# Patient Record
Sex: MALE | Race: WHITE | NOT HISPANIC OR LATINO | Employment: FULL TIME | URBAN - METROPOLITAN AREA
[De-identification: names, ages, dates, MRNs, and addresses within clinical notes are randomized per-mention and may not be internally consistent; named-entity substitution may affect disease eponyms.]

---

## 2024-05-23 ENCOUNTER — HOSPITAL ENCOUNTER (EMERGENCY)
Facility: HOSPITAL | Age: 64
Discharge: ANOTHER HOSPITAL | End: 2024-05-24
Attending: GENERAL PRACTICE
Payer: COMMERCIAL

## 2024-05-23 DIAGNOSIS — I63.412 EMBOLIC STROKE INVOLVING LEFT MIDDLE CEREBRAL ARTERY: Primary | ICD-10-CM

## 2024-05-23 DIAGNOSIS — R29.818 ACUTE FOCAL NEUROLOGICAL DEFICIT: ICD-10-CM

## 2024-05-23 LAB
BASOPHILS # BLD AUTO: 0.07 X10(3)/MCL
BASOPHILS NFR BLD AUTO: 0.9 %
EOSINOPHIL # BLD AUTO: 0.14 X10(3)/MCL (ref 0–0.9)
EOSINOPHIL NFR BLD AUTO: 1.7 %
ERYTHROCYTE [DISTWIDTH] IN BLOOD BY AUTOMATED COUNT: 13.9 % (ref 11.5–17)
HCT VFR BLD AUTO: 42.1 % (ref 42–52)
HGB BLD-MCNC: 14.1 G/DL (ref 14–18)
IMM GRANULOCYTES # BLD AUTO: 0.03 X10(3)/MCL (ref 0–0.04)
IMM GRANULOCYTES NFR BLD AUTO: 0.4 %
LYMPHOCYTES # BLD AUTO: 1.61 X10(3)/MCL (ref 0.6–4.6)
LYMPHOCYTES NFR BLD AUTO: 19.7 %
MCH RBC QN AUTO: 30.9 PG (ref 27–31)
MCHC RBC AUTO-ENTMCNC: 33.5 G/DL (ref 33–36)
MCV RBC AUTO: 92.3 FL (ref 80–94)
MONOCYTES # BLD AUTO: 0.59 X10(3)/MCL (ref 0.1–1.3)
MONOCYTES NFR BLD AUTO: 7.2 %
NEUTROPHILS # BLD AUTO: 5.75 X10(3)/MCL (ref 2.1–9.2)
NEUTROPHILS NFR BLD AUTO: 70.1 %
NRBC BLD AUTO-RTO: 0 %
PLATELET # BLD AUTO: 261 X10(3)/MCL (ref 130–400)
PMV BLD AUTO: 9.6 FL (ref 7.4–10.4)
POC CARDIAC TROPONIN I: 0 NG/ML (ref 0–0.08)
RBC # BLD AUTO: 4.56 X10(6)/MCL (ref 4.7–6.1)
SAMPLE: NORMAL
WBC # SPEC AUTO: 8.19 X10(3)/MCL (ref 4.5–11.5)

## 2024-05-23 PROCEDURE — 36600 WITHDRAWAL OF ARTERIAL BLOOD: CPT

## 2024-05-23 PROCEDURE — 82550 ASSAY OF CK (CPK): CPT | Performed by: GENERAL PRACTICE

## 2024-05-23 PROCEDURE — 83880 ASSAY OF NATRIURETIC PEPTIDE: CPT | Performed by: GENERAL PRACTICE

## 2024-05-23 PROCEDURE — 82962 GLUCOSE BLOOD TEST: CPT

## 2024-05-23 PROCEDURE — 93005 ELECTROCARDIOGRAM TRACING: CPT

## 2024-05-23 PROCEDURE — 82803 BLOOD GASES ANY COMBINATION: CPT

## 2024-05-23 PROCEDURE — 80061 LIPID PANEL: CPT | Performed by: GENERAL PRACTICE

## 2024-05-23 PROCEDURE — 99900031 HC PATIENT EDUCATION (STAT)

## 2024-05-23 PROCEDURE — 99291 CRITICAL CARE FIRST HOUR: CPT

## 2024-05-23 PROCEDURE — 85025 COMPLETE CBC W/AUTO DIFF WBC: CPT | Performed by: GENERAL PRACTICE

## 2024-05-23 PROCEDURE — 84443 ASSAY THYROID STIM HORMONE: CPT | Performed by: GENERAL PRACTICE

## 2024-05-23 PROCEDURE — 82553 CREATINE MB FRACTION: CPT | Performed by: GENERAL PRACTICE

## 2024-05-23 PROCEDURE — 80053 COMPREHEN METABOLIC PANEL: CPT | Performed by: GENERAL PRACTICE

## 2024-05-23 RX ADMIN — IOPAMIDOL 50 ML: 755 INJECTION, SOLUTION INTRAVENOUS at 11:05

## 2024-05-24 ENCOUNTER — ANESTHESIA (OUTPATIENT)
Dept: INTERVENTIONAL RADIOLOGY/VASCULAR | Facility: HOSPITAL | Age: 64
DRG: 023 | End: 2024-05-24
Payer: COMMERCIAL

## 2024-05-24 ENCOUNTER — HOSPITAL ENCOUNTER (INPATIENT)
Dept: INTERVENTIONAL RADIOLOGY/VASCULAR | Facility: HOSPITAL | Age: 64
LOS: 5 days | Discharge: HOSPICE | DRG: 023 | End: 2024-05-29
Attending: PSYCHIATRY & NEUROLOGY | Admitting: INTERNAL MEDICINE
Payer: COMMERCIAL

## 2024-05-24 VITALS
WEIGHT: 191 LBS | SYSTOLIC BLOOD PRESSURE: 179 MMHG | DIASTOLIC BLOOD PRESSURE: 86 MMHG | BODY MASS INDEX: 27.35 KG/M2 | OXYGEN SATURATION: 98 % | HEIGHT: 70 IN | TEMPERATURE: 99 F | HEART RATE: 80 BPM | RESPIRATION RATE: 17 BRPM

## 2024-05-24 DIAGNOSIS — I65.22 CAROTID ARTERY STENOSIS, SYMPTOMATIC, LEFT: ICD-10-CM

## 2024-05-24 DIAGNOSIS — I48.91 A-FIB: ICD-10-CM

## 2024-05-24 DIAGNOSIS — I63.512 ACUTE CEREBROVASCULAR ACCIDENT (CVA) DUE TO OCCLUSION OF LEFT MIDDLE CEREBRAL ARTERY: Primary | ICD-10-CM

## 2024-05-24 DIAGNOSIS — I63.9 CEREBROVASCULAR ACCIDENT (CVA), UNSPECIFIED MECHANISM: ICD-10-CM

## 2024-05-24 DIAGNOSIS — I63.9 STROKE: ICD-10-CM

## 2024-05-24 DIAGNOSIS — Z09 FOLLOW-UP EXAM: ICD-10-CM

## 2024-05-24 DIAGNOSIS — R00.0 TACHYCARDIA: ICD-10-CM

## 2024-05-24 DIAGNOSIS — I63.9 ACUTE CVA (CEREBROVASCULAR ACCIDENT): ICD-10-CM

## 2024-05-24 DIAGNOSIS — I49.9 ARRHYTHMIA: ICD-10-CM

## 2024-05-24 LAB
ALBUMIN SERPL-MCNC: 3.5 G/DL (ref 3.4–4.8)
ALBUMIN SERPL-MCNC: 4.1 G/DL (ref 3.4–4.8)
ALBUMIN/GLOB SERPL: 1.3 RATIO (ref 1.1–2)
ALBUMIN/GLOB SERPL: 1.4 RATIO (ref 1.1–2)
ALP SERPL-CCNC: 44 UNIT/L (ref 40–150)
ALP SERPL-CCNC: 48 UNIT/L (ref 40–150)
ALT SERPL-CCNC: 16 UNIT/L (ref 0–55)
ALT SERPL-CCNC: 24 UNIT/L (ref 0–55)
ANION GAP SERPL CALC-SCNC: 11 MEQ/L
ANION GAP SERPL CALC-SCNC: 7 MEQ/L
APTT PPP: 24.3 SECONDS (ref 21.4–28.3)
AST SERPL-CCNC: 15 UNIT/L (ref 5–34)
AST SERPL-CCNC: 19 UNIT/L (ref 5–34)
BASOPHILS # BLD AUTO: 0.04 X10(3)/MCL
BASOPHILS NFR BLD AUTO: 0.4 %
BILIRUB SERPL-MCNC: 0.2 MG/DL
BILIRUB SERPL-MCNC: 0.2 MG/DL
BNP BLD-MCNC: 19.6 PG/ML
BUN SERPL-MCNC: 19.5 MG/DL (ref 8.4–25.7)
BUN SERPL-MCNC: 20 MG/DL (ref 8.4–25.7)
CALCIUM SERPL-MCNC: 8.4 MG/DL (ref 8.8–10)
CALCIUM SERPL-MCNC: 9.8 MG/DL (ref 8.8–10)
CHLORIDE SERPL-SCNC: 108 MMOL/L (ref 98–107)
CHLORIDE SERPL-SCNC: 109 MMOL/L (ref 98–107)
CHOLEST SERPL-MCNC: 270 MG/DL
CHOLEST/HDLC SERPL: 8 {RATIO} (ref 0–5)
CK MB SERPL-MCNC: 1.2 NG/ML
CK SERPL-CCNC: 203 U/L (ref 30–200)
CO2 SERPL-SCNC: 20 MMOL/L (ref 23–31)
CO2 SERPL-SCNC: 21 MMOL/L (ref 23–31)
CREAT SERPL-MCNC: 0.85 MG/DL (ref 0.73–1.18)
CREAT SERPL-MCNC: 1.1 MG/DL (ref 0.73–1.18)
CREAT/UREA NIT SERPL: 18
CREAT/UREA NIT SERPL: 23
EOSINOPHIL # BLD AUTO: 0.05 X10(3)/MCL (ref 0–0.9)
EOSINOPHIL NFR BLD AUTO: 0.5 %
ERYTHROCYTE [DISTWIDTH] IN BLOOD BY AUTOMATED COUNT: 13.9 % (ref 11.5–17)
EST. AVERAGE GLUCOSE BLD GHB EST-MCNC: 114 MG/DL
FIO2: 21
GFR SERPLBLD CREATININE-BSD FMLA CKD-EPI: >60 ML/MIN/1.73/M2
GFR SERPLBLD CREATININE-BSD FMLA CKD-EPI: >60 ML/MIN/1.73/M2
GLOBULIN SER-MCNC: 2.6 GM/DL (ref 2.4–3.5)
GLOBULIN SER-MCNC: 3 GM/DL (ref 2.4–3.5)
GLUCOSE SERPL-MCNC: 103 MG/DL (ref 82–115)
GLUCOSE SERPL-MCNC: 107 MG/DL (ref 82–115)
GLUCOSE SERPL-MCNC: 94 MG/DL (ref 70–110)
HBA1C MFR BLD: 5.6 %
HCO3 UR-SCNC: 22.8 MMOL/L (ref 24–28)
HCT VFR BLD AUTO: 39.2 % (ref 42–52)
HDLC SERPL-MCNC: 35 MG/DL (ref 35–60)
HGB BLD-MCNC: 13.1 G/DL (ref 14–18)
IMM GRANULOCYTES # BLD AUTO: 0.04 X10(3)/MCL (ref 0–0.04)
IMM GRANULOCYTES NFR BLD AUTO: 0.4 %
INR PPP: 0.9
LDLC SERPL CALC-MCNC: 156 MG/DL (ref 50–140)
LYMPHOCYTES # BLD AUTO: 0.89 X10(3)/MCL (ref 0.6–4.6)
LYMPHOCYTES NFR BLD AUTO: 8.5 %
MCH RBC QN AUTO: 31.4 PG (ref 27–31)
MCHC RBC AUTO-ENTMCNC: 33.4 G/DL (ref 33–36)
MCV RBC AUTO: 94 FL (ref 80–94)
MONOCYTES # BLD AUTO: 0.33 X10(3)/MCL (ref 0.1–1.3)
MONOCYTES NFR BLD AUTO: 3.1 %
NEUTROPHILS # BLD AUTO: 9.15 X10(3)/MCL (ref 2.1–9.2)
NEUTROPHILS NFR BLD AUTO: 87.1 %
NRBC BLD AUTO-RTO: 0 %
PCO2 BLDA: 39 MMHG (ref 35–45)
PH SMN: 7.37 [PH] (ref 7.35–7.45)
PLATELET # BLD AUTO: 232 X10(3)/MCL (ref 130–400)
PMV BLD AUTO: 9.7 FL (ref 7.4–10.4)
PO2 BLDA: 77 MMHG (ref 80–100)
POC BE: -2 MMOL/L
POC SATURATED O2: 95 % (ref 95–100)
POC TCO2: 24 MMOL/L (ref 23–27)
POCT GLUCOSE: 103 MG/DL (ref 70–110)
POCT GLUCOSE: 109 MG/DL (ref 70–110)
POCT GLUCOSE: 120 MG/DL (ref 70–110)
POTASSIUM SERPL-SCNC: 4.4 MMOL/L (ref 3.5–5.1)
POTASSIUM SERPL-SCNC: 4.6 MMOL/L (ref 3.5–5.1)
PROT SERPL-MCNC: 6.1 GM/DL (ref 5.8–7.6)
PROT SERPL-MCNC: 7.1 GM/DL (ref 5.8–7.6)
PROTHROMBIN TIME: 9.7 SECONDS (ref 9.1–10.9)
RBC # BLD AUTO: 4.17 X10(6)/MCL (ref 4.7–6.1)
SAMPLE: ABNORMAL
SODIUM SERPL-SCNC: 136 MMOL/L (ref 136–145)
SODIUM SERPL-SCNC: 140 MMOL/L (ref 136–145)
TRIGL SERPL-MCNC: 393 MG/DL (ref 34–140)
TSH SERPL-ACNC: 0.82 UIU/ML (ref 0.35–4.94)
VLDLC SERPL CALC-MCNC: 79 MG/DL
WBC # SPEC AUTO: 10.5 X10(3)/MCL (ref 4.5–11.5)

## 2024-05-24 PROCEDURE — 27100171 HC OXYGEN HIGH FLOW UP TO 24 HOURS

## 2024-05-24 PROCEDURE — 63600175 PHARM REV CODE 636 W HCPCS

## 2024-05-24 PROCEDURE — 80053 COMPREHEN METABOLIC PANEL: CPT

## 2024-05-24 PROCEDURE — 94761 N-INVAS EAR/PLS OXIMETRY MLT: CPT

## 2024-05-24 PROCEDURE — 99900031 HC PATIENT EDUCATION (STAT)

## 2024-05-24 PROCEDURE — 85730 THROMBOPLASTIN TIME PARTIAL: CPT | Performed by: GENERAL PRACTICE

## 2024-05-24 PROCEDURE — 85025 COMPLETE CBC W/AUTO DIFF WBC: CPT

## 2024-05-24 PROCEDURE — 63600175 PHARM REV CODE 636 W HCPCS: Performed by: NURSE ANESTHETIST, CERTIFIED REGISTERED

## 2024-05-24 PROCEDURE — B3141ZZ FLUOROSCOPY OF LEFT COMMON CAROTID ARTERY USING LOW OSMOLAR CONTRAST: ICD-10-PCS | Performed by: PSYCHIATRY & NEUROLOGY

## 2024-05-24 PROCEDURE — 25000003 PHARM REV CODE 250: Performed by: PSYCHIATRY & NEUROLOGY

## 2024-05-24 PROCEDURE — 25500020 PHARM REV CODE 255: Performed by: PSYCHIATRY & NEUROLOGY

## 2024-05-24 PROCEDURE — 94760 N-INVAS EAR/PLS OXIMETRY 1: CPT

## 2024-05-24 PROCEDURE — 85610 PROTHROMBIN TIME: CPT | Performed by: GENERAL PRACTICE

## 2024-05-24 PROCEDURE — 94002 VENT MGMT INPAT INIT DAY: CPT

## 2024-05-24 PROCEDURE — 36415 COLL VENOUS BLD VENIPUNCTURE: CPT

## 2024-05-24 PROCEDURE — 61645 PERQ ART M-THROMBECT &/NFS: CPT

## 2024-05-24 PROCEDURE — 99900035 HC TECH TIME PER 15 MIN (STAT)

## 2024-05-24 PROCEDURE — C9248 INJ, CLEVIDIPINE BUTYRATE: HCPCS | Performed by: INTERNAL MEDICINE

## 2024-05-24 PROCEDURE — 63600175 PHARM REV CODE 636 W HCPCS: Performed by: INTERNAL MEDICINE

## 2024-05-24 PROCEDURE — 5A1945Z RESPIRATORY VENTILATION, 24-96 CONSECUTIVE HOURS: ICD-10-PCS | Performed by: PSYCHIATRY & NEUROLOGY

## 2024-05-24 PROCEDURE — D9220A PRA ANESTHESIA: Mod: ANES,,, | Performed by: ANESTHESIOLOGY

## 2024-05-24 PROCEDURE — 02HV33Z INSERTION OF INFUSION DEVICE INTO SUPERIOR VENA CAVA, PERCUTANEOUS APPROACH: ICD-10-PCS | Performed by: PSYCHIATRY & NEUROLOGY

## 2024-05-24 PROCEDURE — 25000003 PHARM REV CODE 250: Performed by: NURSE PRACTITIONER

## 2024-05-24 PROCEDURE — 25000003 PHARM REV CODE 250: Performed by: NURSE ANESTHETIST, CERTIFIED REGISTERED

## 2024-05-24 PROCEDURE — 93010 ELECTROCARDIOGRAM REPORT: CPT | Mod: ,,, | Performed by: INTERNAL MEDICINE

## 2024-05-24 PROCEDURE — 93005 ELECTROCARDIOGRAM TRACING: CPT

## 2024-05-24 PROCEDURE — B3171ZZ FLUOROSCOPY OF LEFT INTERNAL CAROTID ARTERY USING LOW OSMOLAR CONTRAST: ICD-10-PCS | Performed by: PSYCHIATRY & NEUROLOGY

## 2024-05-24 PROCEDURE — 36569 INSJ PICC 5 YR+ W/O IMAGING: CPT

## 2024-05-24 PROCEDURE — 99223 1ST HOSP IP/OBS HIGH 75: CPT | Mod: 25,,, | Performed by: PSYCHIATRY & NEUROLOGY

## 2024-05-24 PROCEDURE — C1751 CATH, INF, PER/CENT/MIDLINE: HCPCS

## 2024-05-24 PROCEDURE — 03CG3ZZ EXTIRPATION OF MATTER FROM INTRACRANIAL ARTERY, PERCUTANEOUS APPROACH: ICD-10-PCS | Performed by: PSYCHIATRY & NEUROLOGY

## 2024-05-24 PROCEDURE — 61645 PERQ ART M-THROMBECT &/NFS: CPT | Mod: LT,,, | Performed by: PSYCHIATRY & NEUROLOGY

## 2024-05-24 PROCEDURE — 25000003 PHARM REV CODE 250: Performed by: INTERNAL MEDICINE

## 2024-05-24 PROCEDURE — 27000221 HC OXYGEN, UP TO 24 HOURS

## 2024-05-24 PROCEDURE — 20000000 HC ICU ROOM

## 2024-05-24 PROCEDURE — 63600175 PHARM REV CODE 636 W HCPCS: Performed by: PSYCHIATRY & NEUROLOGY

## 2024-05-24 PROCEDURE — 83036 HEMOGLOBIN GLYCOSYLATED A1C: CPT | Performed by: NURSE PRACTITIONER

## 2024-05-24 PROCEDURE — 037L3ZZ DILATION OF LEFT INTERNAL CAROTID ARTERY, PERCUTANEOUS APPROACH: ICD-10-PCS | Performed by: PSYCHIATRY & NEUROLOGY

## 2024-05-24 PROCEDURE — D9220A PRA ANESTHESIA: Mod: CRNA,,, | Performed by: NURSE ANESTHETIST, CERTIFIED REGISTERED

## 2024-05-24 PROCEDURE — 99900026 HC AIRWAY MAINTENANCE (STAT)

## 2024-05-24 PROCEDURE — 27200966 HC CLOSED SUCTION SYSTEM

## 2024-05-24 PROCEDURE — 25500020 PHARM REV CODE 255: Performed by: GENERAL PRACTICE

## 2024-05-24 RX ORDER — FAMOTIDINE 10 MG/ML
20 INJECTION INTRAVENOUS DAILY
Status: DISCONTINUED | OUTPATIENT
Start: 2024-05-24 | End: 2024-05-25

## 2024-05-24 RX ORDER — ATORVASTATIN CALCIUM 40 MG/1
80 TABLET, FILM COATED ORAL DAILY
Status: DISCONTINUED | OUTPATIENT
Start: 2024-05-24 | End: 2024-05-29 | Stop reason: HOSPADM

## 2024-05-24 RX ORDER — ASPIRIN 300 MG/1
300 SUPPOSITORY RECTAL DAILY
Status: DISCONTINUED | OUTPATIENT
Start: 2024-05-24 | End: 2024-05-29 | Stop reason: HOSPADM

## 2024-05-24 RX ORDER — ONDANSETRON HYDROCHLORIDE 2 MG/ML
INJECTION, SOLUTION INTRAVENOUS
Status: COMPLETED
Start: 2024-05-24 | End: 2024-05-24

## 2024-05-24 RX ORDER — ACETAMINOPHEN 325 MG/1
650 TABLET ORAL EVERY 4 HOURS PRN
Status: DISCONTINUED | OUTPATIENT
Start: 2024-05-24 | End: 2024-05-25

## 2024-05-24 RX ORDER — FENTANYL CITRATE 50 UG/ML
INJECTION, SOLUTION INTRAMUSCULAR; INTRAVENOUS
Status: DISCONTINUED | OUTPATIENT
Start: 2024-05-24 | End: 2024-05-24

## 2024-05-24 RX ORDER — PHENYLEPHRINE HYDROCHLORIDE 10 MG/ML
INJECTION INTRAVENOUS
Status: DISCONTINUED | OUTPATIENT
Start: 2024-05-24 | End: 2024-05-24

## 2024-05-24 RX ORDER — SODIUM CHLORIDE 0.9 % (FLUSH) 0.9 %
10 SYRINGE (ML) INJECTION EVERY 6 HOURS
Status: DISCONTINUED | OUTPATIENT
Start: 2024-05-25 | End: 2024-05-29 | Stop reason: HOSPADM

## 2024-05-24 RX ORDER — PROPOFOL 10 MG/ML
0-50 INJECTION, EMULSION INTRAVENOUS CONTINUOUS
Status: DISCONTINUED | OUTPATIENT
Start: 2024-05-24 | End: 2024-05-24

## 2024-05-24 RX ORDER — BISACODYL 10 MG/1
10 SUPPOSITORY RECTAL DAILY PRN
Status: DISCONTINUED | OUTPATIENT
Start: 2024-05-24 | End: 2024-05-29 | Stop reason: HOSPADM

## 2024-05-24 RX ORDER — ONDANSETRON 4 MG/1
8 TABLET, ORALLY DISINTEGRATING ORAL EVERY 8 HOURS PRN
Status: DISCONTINUED | OUTPATIENT
Start: 2024-05-24 | End: 2024-05-29 | Stop reason: HOSPADM

## 2024-05-24 RX ORDER — ROCURONIUM BROMIDE 10 MG/ML
INJECTION, SOLUTION INTRAVENOUS
Status: DISCONTINUED | OUTPATIENT
Start: 2024-05-24 | End: 2024-05-24

## 2024-05-24 RX ORDER — ATROPINE SULFATE 0.1 MG/ML
INJECTION INTRAVENOUS
Status: DISCONTINUED | OUTPATIENT
Start: 2024-05-24 | End: 2024-05-24

## 2024-05-24 RX ORDER — LIDOCAINE HYDROCHLORIDE 20 MG/ML
INJECTION, SOLUTION EPIDURAL; INFILTRATION; INTRACAUDAL; PERINEURAL
Status: DISCONTINUED | OUTPATIENT
Start: 2024-05-24 | End: 2024-05-24

## 2024-05-24 RX ORDER — 3% SODIUM CHLORIDE 3 G/100ML
60 INJECTION, SOLUTION INTRAVENOUS CONTINUOUS
Status: DISCONTINUED | OUTPATIENT
Start: 2024-05-24 | End: 2024-05-27

## 2024-05-24 RX ORDER — SODIUM CHLORIDE 9 MG/ML
INJECTION, SOLUTION INTRAVENOUS CONTINUOUS
Status: DISCONTINUED | OUTPATIENT
Start: 2024-05-24 | End: 2024-05-24

## 2024-05-24 RX ORDER — GLYCOPYRROLATE 0.2 MG/ML
INJECTION INTRAMUSCULAR; INTRAVENOUS
Status: DISCONTINUED | OUTPATIENT
Start: 2024-05-24 | End: 2024-05-24

## 2024-05-24 RX ORDER — HYDRALAZINE HYDROCHLORIDE 20 MG/ML
10 INJECTION INTRAMUSCULAR; INTRAVENOUS EVERY 6 HOURS PRN
Status: DISCONTINUED | OUTPATIENT
Start: 2024-05-24 | End: 2024-05-28

## 2024-05-24 RX ORDER — SODIUM CHLORIDE 0.9 % (FLUSH) 0.9 %
10 SYRINGE (ML) INJECTION
Status: DISCONTINUED | OUTPATIENT
Start: 2024-05-24 | End: 2024-05-29 | Stop reason: HOSPADM

## 2024-05-24 RX ORDER — PROPOFOL 10 MG/ML
0-50 INJECTION, EMULSION INTRAVENOUS CONTINUOUS
Status: DISCONTINUED | OUTPATIENT
Start: 2024-05-24 | End: 2024-05-26

## 2024-05-24 RX ORDER — FAMOTIDINE 10 MG/ML
20 INJECTION INTRAVENOUS DAILY
Status: DISCONTINUED | OUTPATIENT
Start: 2024-05-25 | End: 2024-05-24

## 2024-05-24 RX ORDER — ONDANSETRON HYDROCHLORIDE 2 MG/ML
INJECTION, SOLUTION INTRAVENOUS
Status: DISCONTINUED | OUTPATIENT
Start: 2024-05-24 | End: 2024-05-24

## 2024-05-24 RX ORDER — PROPOFOL 10 MG/ML
VIAL (ML) INTRAVENOUS
Status: DISCONTINUED | OUTPATIENT
Start: 2024-05-24 | End: 2024-05-24

## 2024-05-24 RX ORDER — DEXAMETHASONE SODIUM PHOSPHATE 4 MG/ML
INJECTION, SOLUTION INTRA-ARTICULAR; INTRALESIONAL; INTRAMUSCULAR; INTRAVENOUS; SOFT TISSUE
Status: DISCONTINUED | OUTPATIENT
Start: 2024-05-24 | End: 2024-05-24

## 2024-05-24 RX ORDER — FAMOTIDINE 20 MG/1
20 TABLET, FILM COATED ORAL DAILY
Status: DISCONTINUED | OUTPATIENT
Start: 2024-05-24 | End: 2024-05-24

## 2024-05-24 RX ADMIN — PROPOFOL 200 MG: 10 INJECTION, EMULSION INTRAVENOUS at 02:05

## 2024-05-24 RX ADMIN — SODIUM CHLORIDE 60 ML/HR: 3 INJECTION, SOLUTION INTRAVENOUS at 07:05

## 2024-05-24 RX ADMIN — ATROPINE SULFATE 0.4 MG: 0.1 INJECTION INTRAVENOUS at 02:05

## 2024-05-24 RX ADMIN — GLYCOPYRROLATE 0.2 MG: 0.2 INJECTION INTRAMUSCULAR; INTRAVENOUS at 02:05

## 2024-05-24 RX ADMIN — ROCURONIUM BROMIDE 80 MG: 10 SOLUTION INTRAVENOUS at 02:05

## 2024-05-24 RX ADMIN — FENTANYL CITRATE 50 MCG: 50 INJECTION, SOLUTION INTRAMUSCULAR; INTRAVENOUS at 02:05

## 2024-05-24 RX ADMIN — FENTANYL CITRATE 50 MCG: 50 INJECTION, SOLUTION INTRAMUSCULAR; INTRAVENOUS at 03:05

## 2024-05-24 RX ADMIN — SODIUM CHLORIDE: 9 INJECTION, SOLUTION INTRAVENOUS at 04:05

## 2024-05-24 RX ADMIN — HYDRALAZINE HYDROCHLORIDE 10 MG: 20 INJECTION INTRAMUSCULAR; INTRAVENOUS at 07:05

## 2024-05-24 RX ADMIN — IOPAMIDOL 100 ML: 755 INJECTION, SOLUTION INTRAVENOUS at 03:05

## 2024-05-24 RX ADMIN — SODIUM CHLORIDE: 9 INJECTION, SOLUTION INTRAVENOUS at 01:05

## 2024-05-24 RX ADMIN — PROPOFOL 45 MCG/KG/MIN: 10 INJECTION, EMULSION INTRAVENOUS at 06:05

## 2024-05-24 RX ADMIN — ONDANSETRON 4 MG: 2 INJECTION INTRAMUSCULAR; INTRAVENOUS at 02:05

## 2024-05-24 RX ADMIN — ASPIRIN 300 MG: 300 SUPPOSITORY RECTAL at 03:05

## 2024-05-24 RX ADMIN — FAMOTIDINE 20 MG: 10 INJECTION, SOLUTION INTRAVENOUS at 08:05

## 2024-05-24 RX ADMIN — DEXAMETHASONE SODIUM PHOSPHATE 4 MG: 4 INJECTION, SOLUTION INTRA-ARTICULAR; INTRALESIONAL; INTRAMUSCULAR; INTRAVENOUS; SOFT TISSUE at 02:05

## 2024-05-24 RX ADMIN — PHENYLEPHRINE HYDROCHLORIDE 100 MCG: 10 INJECTION INTRAVENOUS at 02:05

## 2024-05-24 RX ADMIN — CLEVIPIDINE 6 MG/HR: 0.5 EMULSION INTRAVENOUS at 04:05

## 2024-05-24 RX ADMIN — SODIUM CHLORIDE: 9 INJECTION, SOLUTION INTRAVENOUS at 02:05

## 2024-05-24 RX ADMIN — CLEVIPIDINE 2 MG/HR: 0.5 EMULSION INTRAVENOUS at 12:05

## 2024-05-24 RX ADMIN — PROPOFOL 50 MCG/KG/MIN: 10 INJECTION, EMULSION INTRAVENOUS at 06:05

## 2024-05-24 RX ADMIN — LIDOCAINE HYDROCHLORIDE 80 MG: 20 INJECTION, SOLUTION INTRAVENOUS at 02:05

## 2024-05-24 RX ADMIN — SUGAMMADEX 173 MG: 100 INJECTION, SOLUTION INTRAVENOUS at 03:05

## 2024-05-24 RX ADMIN — PROPOFOL 45 MCG/KG/MIN: 10 INJECTION, EMULSION INTRAVENOUS at 04:05

## 2024-05-24 RX ADMIN — ONDANSETRON 4 MG: 2 INJECTION INTRAMUSCULAR; INTRAVENOUS at 01:05

## 2024-05-24 NOTE — PROCEDURES
"Robert Degroot is a 63 y.o. male patient.    Temp: 98.3 °F (36.8 °C) (05/24/24 0800)  Pulse: 90 (05/24/24 1745)  Resp: 14 (05/24/24 1745)  BP: (!) 145/71 (05/24/24 1445)  SpO2: 98 % (05/24/24 1745)  Weight: 84.9 kg (187 lb 2.7 oz) (05/24/24 0435)  Height: 5' 10" (177.8 cm) (05/24/24 0435)    PICC  Date/Time: 5/24/2024 6:44 PM  Performed by: Smiley Vincent RN  Consent Done: Yes  Time out: Immediately prior to procedure a time out was called to verify the correct patient, procedure, equipment, support staff and site/side marked as required  Indications: med administration and vascular access  Anesthesia: local infiltration  Local anesthetic: lidocaine 2% without epinephrine    Preparation: skin prepped with ChloraPrep  Skin prep agent dried: skin prep agent completely dried prior to procedure  Sterile barriers: all five maximum sterile barriers used - cap, mask, sterile gown, sterile gloves, and large sterile sheet  Hand hygiene: hand hygiene performed prior to central venous catheter insertion  Location details: right brachial  Catheter type: triple lumen  Catheter size: 5 Fr  Catheter Length: 39cm    Ultrasound guidance: yes  Vessel Caliber: patent, compressibility normal  Needle advanced into vessel with real time Ultrasound guidance.  Guidewire confirmed in vessel.  Sterile sheath used.  Number of attempts: 1  Post-procedure: blood return through all ports, chlorhexidine patch and sterile dressing applied            Name JANET Vincent RN   5/24/2024    "

## 2024-05-24 NOTE — ANESTHESIA PREPROCEDURE EVALUATION
05/24/2024  Robert Degroot is a 63 y.o., male.      Pre-op Assessment    I have reviewed the Patient Summary Reports.     I have reviewed the Nursing Notes.       Review of Systems  Neurological:   CVA                                           Anesthesia Plan  Type of Anesthesia, risks & benefits discussed:    Anesthesia Type: Gen ETT  Intra-op Monitoring Plan: Standard ASA Monitors  Post Op Pain Control Plan: multimodal analgesia  Induction:  IV and rapid sequence  Airway Plan: Direct  ASA Score: 3 Emergent  Day of Surgery Review of History & Physical: H&P Update referred to the surgeon/provider.  Anesthesia Plan Notes: 62 y/o male. No history. Coming from [place]. At 2200, evaluated for aphasia, R sided agnosia, paralysis. CT angio revealed L MCO occlusion.    Ready For Surgery From Anesthesia Perspective.   .

## 2024-05-24 NOTE — ED PROVIDER NOTES
Encounter Date: 5/23/2024       History     Chief Complaint   Patient presents with    Cerebrovascular Accident     Pt went to take nap at 1700, [facility] called at around 2200      Pt went to take nap at 1700, [facility] called at around 2200. Right sided Hemineglect, Aphasic, right side agnosia        Review of patient's allergies indicates:  No Known Allergies  No past medical history on file.  Past Surgical History:   Procedure Laterality Date    CRANIECTOMY N/A 5/25/2024    Procedure: CRANIECTOMY;  Surgeon: Rico Adam MD;  Location: [facility];  Service: Neurosurgery;  Laterality: N/A;     No family history on file.     Review of Systems   Unable to perform ROS: Patient nonverbal       Physical Exam     Initial Vitals   BP Pulse Resp Temp SpO2   05/23/24 2350 05/23/24 2350 05/23/24 2350 05/23/24 2325 05/23/24 2325   (!) 188/102 71 (!) 22 98.6 °F (37 °C) 96 %      MAP       --                Physical Exam    Nursing note and vitals reviewed.  Constitutional: He appears well-developed and well-nourished. He appears listless.   HENT:   Head: Normocephalic.   Eyes: Pupils are equal, round, and reactive to light. Right eye exhibits abnormal extraocular motion. Left eye exhibits abnormal extraocular motion.   Neck: Neck supple.   Normal range of motion.  Cardiovascular:  Normal rate, regular rhythm, normal heart sounds and intact distal pulses.           Pulmonary/Chest: Breath sounds normal.   Abdominal: Abdomen is soft. Bowel sounds are normal.   Musculoskeletal:         General: No tenderness.      Cervical back: Normal range of motion and neck supple.     Neurological: He appears listless. A cranial nerve deficit is present. He exhibits abnormal muscle tone. GCS eye subscore is 4. GCS verbal subscore is 1. GCS motor subscore is 5.   Right sided flaccid paralysis, right edmund-neglect, right sided facial droop. NON-VERBAL. LCO scoring consistent with Large vessel occlusion.   Skin: Skin is warm and dry.         ED Course    Critical Care    Date/Time: 5/30/2024 3:28 PM    Performed by: Saleem Iyer MD  Authorized by: Saleem Iyer MD  Direct patient critical care time: 15 minutes  Additional history critical care time: 10 minutes  Ordering / reviewing critical care time: 10 minutes  Documentation critical care time: 25 minutes  Consulting other physicians critical care time: 5 minutes  Total critical care time (exclusive of procedural time) : 65 minutes  Critical care was necessary to treat or prevent imminent or life-threatening deterioration of the following conditions: CNS failure or compromise and circulatory failure.  Critical care was time spent personally by me on the following activities: discussions with consultants, examination of patient, obtaining history from patient or surrogate, ordering and performing treatments and interventions, ordering and review of laboratory studies, ordering and review of radiographic studies, pulse oximetry, re-evaluation of patient's condition, evaluation of patient's response to treatment and interpretation of cardiac output measurements.        Labs Reviewed   COMPREHENSIVE METABOLIC PANEL - Abnormal; Notable for the following components:       Result Value    Chloride 108 (*)     CO2 21 (*)     All other components within normal limits   LIPID PANEL - Abnormal; Notable for the following components:    Cholesterol Total 270 (*)     Triglyceride 393 (*)     Cholesterol/HDL Ratio 8 (*)     LDL Cholesterol 156.00 (*)     All other components within normal limits   CK - Abnormal; Notable for the following components:    Creatine Kinase 203 (*)     All other components within normal limits   CBC WITH DIFFERENTIAL - Abnormal; Notable for the following components:    RBC 4.56 (*)     All other components within normal limits   ISTAT PROCEDURE - Abnormal; Notable for the following components:    POC PO2 77 (*)     POC HCO3 22.8 (*)     All other components within normal limits   PROTIME-INR  - Normal   TSH - Normal   APTT - Normal   CK-MB - Normal   B-TYPE NATRIURETIC PEPTIDE - Normal   CBC W/ AUTO DIFFERENTIAL    Narrative:     The following orders were created for panel order CBC W/ AUTO DIFFERENTIAL.  Procedure                               Abnormality         Status                     ---------                               -----------         ------                     CBC with Differential[[id number]]       Abnormal            Final result                 Please view results for these tests on the individual orders.   TROPONIN ISTAT   POCT GLUCOSE, HAND-HELD DEVICE   POCT GLUCOSE     EKG Readings: (Independently Interpreted)   Rhythm: Normal Sinus Rhythm. Heart Rate: 90. Ectopy: No Ectopy PACs. Conduction: Normal. ST Segments: Normal ST Segments. T Waves: Normal. Axis: Normal. Clinical Impression: Normal Sinus Rhythm     ECG Results              ECG 12 lead (Final result)        Collection Time Result Time QRS Duration OHS QTC Calculation    05/23/24 23:52:19 05/25/24 01:47:32 94 469                     Final result by Interface, Lab In [facility] (05/25/24 01:47:40)                   Narrative:    Test Reason : R29.818,    Vent. Rate : 090 BPM     Atrial Rate : 090 BPM     P-R Int : 154 ms          QRS Dur : 094 ms      QT Int : 384 ms       P-R-T Axes : 077 068 027 degrees     QTc Int : 469 ms    Sinus rhythm with Premature atrial complexes  Otherwise normal ECG  No previous ECGs available  Confirmed by Giovanny BRADEN, Zane ([id number]) on 5/25/2024 1:47:28 AM    Referred By: [email or web address]   SELF           Confirmed By:Zane Baltazar MD                                  Imaging Results              IR Thrombectomy Intracranial Inc all Imaging (Final result)  Result time 05/24/24 07:36:42      Final result by Zander Ritchie MD (05/24/24 07:36:42)                   Impression:      Fluoroscopic support.  Please refer to procedure of this dictation.      Electronically signed by: Zander  Chau  Date:    05/24/2024  Time:    07:36               Narrative:    EXAMINATION:  IR THROMBECTOMY INTRACRANIAL INC ALL IMAGING    CLINICAL HISTORY:  stroke;    TECHNIQUE:  346mGy of fluoroscopic support was utilized for procedural support during procedure.  Please refer to performing provider dictation.    COMPARISON:  None    FINDINGS:  Fluoroscopic support. Please refer to procedure of this dictation.                                       CTA Head and Neck (xpd) (Final result)  Result time 05/24/24 09:09:15      Final result by Chao Arcos MD (05/24/24 09:09:15)                   Impression:    Impression:    1. There is non-opacification of the proximal to distal M1 segment of the left middle cerebral artery suggesting occlusion or severe stenosis(series 15, image 27). There is associated large acute MCA territorial infarct in the left fontotemporoparietal lobe.    2.  Hemodynamically significant stenosis proximal left internal carotid artery.    3. Details and other findings as described above.    Preliminary report given by overnight radiologist.      Electronically signed by: Chao Arcos  Date:    05/24/2024  Time:    09:09               Narrative:      Technique: CT angiogram of the intracranial vessels was performed with intravenous contrast with direct axial as well as sagittal and coronal reformations. CT angiogram of the neck vessels was performed with intravenous contrast with direct axial as well as sagittal and coronal reformations.    Comparison: Correlation is with study dated 2024-05-23 23:31:10.    Clinical history: Pt went to take nap at 1700, [facility] called at around 2200 code fast called.    Findings:    Intracranial Vascular structures:    Internal carotid arteries: Moderate atheromatous calcification of the cavernous clinoid and supraclinoid segment of the internal carotid artery is seen with associated mild to moderate stenosis.    Middle cerebral arteries: The right middle cerebral  artery is unremarkable. There is non-opacification of the proximal to distal M1 segment of the left middle cerebral artery suggesting occlusion or severe stenosis(series 15, image 27). There is associated large acute MCA territorial infarct in the left fontotemporoparietal lobe.    Anterior cerebral arteries: Unremarkable.    Vertebral arteries: The right vertebral artery is dominant.    Basilar artery: Unremarkable.    Posterior cerebral arteries: Unremarkable.    Posterior communicating arteries: Unremarkable.    Jugular Veins and venous sinuses: Unremarkable.    Neck Vascular structures: Moderate atheromatous calcification with associated mild stenosis is seen at the origin of the brachiocephalic subclavian common carotid arteries.    Common carotid arteries:    Right common carotid artery: Mild atheromatous calcification of the distal segments of the right common carotid artery is seen with associated mild stenosis.    Left common carotid artery: Moderate atheromatous calcification of the proximal and distal segments of the left common carotid artery is seen with moderate stenosis.    Internal carotid artery: There is mild stenosis of the proximal right internal carotid artery by calcified plaques.  There is hemodynamically significant marked stenosis of greater than 70% involving the proximal left internal carotid artery by soft and calcified plaque on image 290 series 18..    Vertebral arteries: Right vertebral artery is dominant.    Jugular Veins and venous sinuses: Unremarkable.    Hemorrhage: No acute intracranial hemorrhage is seen.    Brain parenchyma: No abnormal intracranial enhancement is seen on the post contrast images.    Notifications: The results were discussed prior to dictation with the emergency room physician Dr. Iyer at 2024-05-24 00:28:44 CDT.                        Preliminary result by Oneal Wrothington Jr., MD (05/24/24 00:35:28)                   Impression:    1. There is  non-opacification of the proximal to distal M1 segment of the left middle cerebral artery suggesting occlusion or severe stenosis(series 15, image 27). There is associated large acute MCA territorial infarct in the left fontotemporoparietal lobe.  2. Details and other findings as described above.               Narrative:    START OF REPORT:  Technique: CT angiogram of the intracranial vessels was performed with intravenous contrast with direct axial as well as sagittal and coronal reformations. CT angiogram of the neck vessels was performed with intravenous contrast with direct axial as well as sagittal and coronal reformations.    Comparison: Correlation is with study [id number] 23:31:10.    Clinical history: Pt went to take nap at 1700, [facility] called at around 2200 code fast called.    Findings:  Intracranial Vascular structures:  Internal carotid arteries: Moderate atheromatous calcification of the cavernous clinoid and supraclinoid segment of the internal carotid artery is seen with associated mild to moderate stenosis.  Middle cerebral arteries: The right middle cerebral artery is unremarkable. There is non-opacification of the proximal to distal M1 segment of the left middle cerebral artery suggesting occlusion or severe stenosis(series 15, image 27). There is associated large acute MCA territorial infarct in the left fontotemporoparietal lobe.  Anterior cerebral arteries: Unremarkable.  Vertebral arteries: The right vertebral artery is dominant.  Basilar artery: Unremarkable.  Posterior cerebral arteries: Unremarkable.  Posterior communicating arteries: Unremarkable.  Jugular Veins and venous sinuses: Unremarkable.  Neck Vascular structures: Moderate atheromatous calcification with associated mild stenosis is seen at the origin of the brachiocephalic subclavian common carotid arteries.  Common carotid arteries:  Right common carotid artery: Mild atheromatous calcification of the distal segments of the  right common carotid artery is seen with associated mild stenosis.  Left common carotid artery: Moderate atheromatous calcification of the proximal and distal segments of the left common carotid artery is seen with moderate stenosis.  Internal carotid artery: Unremarkable.  Vertebral arteries: Right vertebral artery is dominant.  Jugular Veins and venous sinuses: Unremarkable.  Hemorrhage: No acute intracranial hemorrhage is seen.  Brain parenchyma: No abnormal intracranial enhancement is seen on the post contrast images.    Notifications: The results were discussed prior to dictation with the emergency room physician Dr. Iyer at 2024-05-24 00:28:44 CDT.                                         CT HEAD FOR STROKE (Final result)  Result time 05/24/24 08:34:35   Procedure changed from CT Head Without Contrast     Final result by Chao Arcos MD (05/24/24 08:34:35)                   Impression:    Impression:    1. There appears to be subtle decrease in attenuation of the left frontotempoparietal brain parenchyma with subtle loss of gray white matter differentiation suggesting a large middle cerebral artery territory acute infarct (series 4, image 33).    2. Details and other findings as noted above.    No significant discrepancy with overnight report.      Electronically signed by: Chao Arcos  Date:    05/24/2024  Time:    08:34               Narrative:      Technique: CT of the head was performed without intravenous contrast with axial as well as coronal and sagittal images.    Comparison: August 30, 2021.    Dosage Information: Automated Exposure Control was utilized 2043.87 mGy.cm.    Clinical history: Stroke.    Findings:    Hemorrhage: No acute intracranial hemorrhage is seen.    CSF spaces: The ventricles sulci and basal cisterns are within normal limits for age.    Brain parenchyma: Mild scattered microvascular change is seen in portions of the periventricular and deep white matter tracts. There appears  to be subtle decrease in attenuation of the left frontotempoparietal brain parenchyma with subtle loss of gray white matter differentiation suggesting a large middle cerebral artery territory acute infarct (series 4, image 33).    Cerebellum: Unremarkable.    Vascular: Moderate scattered atheromatous calcification of the intracranial arteries is seen.    Sella and skull base: The sella appears to be within normal limits for age.    Calvarium: No acute linear or depressed skull fracture is seen.    Maxillofacial Structures:    Paranasal sinuses: The visualized paranasal sinuses appear clear with no significant mucoperiosteal thickening or air fluid levels identified.    Notifications: This is a stroke protocol report and the results were discussed with the emergency room physician Dr. Iyer prior to dictation at 2024-05-23 23:56:27 CDT.                        Preliminary result by Oneal Worthington Jr., MD (05/23/24 23:57:42)                   Impression:    1. There appears to be subtle decrease in attenuation of the left frontotempoparietal brain parenchyma with subtle loss of gray white matter differentiation suggesting a large middle cerebral artery territory acute infarct (series 4, image 33).  2. Details and other findings as noted above.               Narrative:    START OF REPORT:  Technique: CT of the head was performed without intravenous contrast with axial as well as coronal and sagittal images.    Comparison: None.    Dosage Information: Automated Exposure Control was utilized 2043.87 mGy.cm.    Clinical history: Stroke.    Findings:  Hemorrhage: No acute intracranial hemorrhage is seen.  CSF spaces: The ventricles sulci and basal cisterns are within normal limits for age.  Brain parenchyma: No acute infarct. Mild scattered microvascular change is seen in portions of the periventricular and deep white matter tracts. There appears to be subtle decrease in attenuation of the left frontotempoparietal  brain parenchyma with subtle loss of gray white matter differentiation suggesting a large middle cerebral artery territory acute infarct (series 4, image 33).  Cerebellum: Unremarkable.  Vascular: Unremarkable venous sinuses. Moderate scattered atheromatous calcification of the intracranial arteries is seen.  Sella and skull base: The sella appears to be within normal limits for age.  Cerebellopontine angles: Within normal limits.  Herniation: None.  Intracranial calcifications: Incidental note is made of bilateral choroid plexus calcification. Incidental note is made of some pineal region calcification. Incidental note is made of some calcification of the falx.  Calvarium: No acute linear or depressed skull fracture is seen.    Maxillofacial Structures:  Paranasal sinuses: The visualized paranasal sinuses appear clear with no significant mucoperiosteal thickening or air fluid levels identified.  Orbits: The orbits appear unremarkable.  Zygomatic arches: The zygomatic arches are intact and unremarkable.  Temporal bones and mastoids: The temporal bones and mastoids appear unremarkable.  TMJ: The mandibular condyles appear normally placed with respect to the mandibular fossa.    Notifications: This is a stroke protocol report and the results were discussed with the emergency room physician Dr. Iyer prior to dictation at 2024-05-23 23:56:27 CDT.                                         Medications   iopamidoL (ISOVUE-370) injection 50 mL (50 mLs Intravenous Given 5/23/24 2289)     Medical Decision Making  OBVIOUS signs of left sided LVO.  CODE FAST IMMEDIATELY CALLED. Rad confirmed Left MCA territory infarct. We were mixing up TPA when intervention radiology called.  [facility] has no beds or icu, but [facility] interventional radiology called and they accepted him directly to cath lab.  We will send by helicopter.    Amount and/or Complexity of Data Reviewed  Labs: ordered.  Radiology: ordered.    Risk  Prescription drug  management.               ED Course as of 05/30/24 1529   Fri May 24, 2024   0009 Discussed with family. Large left vessel MCA stroke. Code FAST initiated  [PG]      ED Course User Index  [PG] Saleem Iyer MD                           Clinical Impression:  Final diagnoses:  [R29.818] Acute focal neurological deficit  [I63.412] Embolic stroke involving left middle cerebral artery (Primary)          ED Disposition Condition    Transfer to Another Facility Critical                Saleem Iyer MD  05/24/24 0042       Saleem Iyer MD  05/30/24 1529

## 2024-05-24 NOTE — SUBJECTIVE & OBJECTIVE
Subjective:     Interval History:   No new issues,     Current Neurological Medications:     Current Facility-Administered Medications   Medication Dose Route Frequency Provider Last Rate Last Admin    0.9%  NaCl infusion   Intravenous Continuous Gerald Quesada  mL/hr at 05/24/24 0709 Rate Verify at 05/24/24 0709    acetaminophen tablet 650 mg  650 mg Oral Q4H PRN Gerald Quesada MD        atorvastatin tablet 80 mg  80 mg Oral Daily Susanna Strauss MD        clevidipine (CLEVIPREX) 25 mg/50 mL infusion  0-32 mg/hr Intravenous Continuous Ricky Rodas MD        famotidine tablet 20 mg  20 mg Oral Daily Susanna Strauss MD        hydrALAZINE injection 10 mg  10 mg Intravenous Q6H PRN Susanna Strauss MD   10 mg at 05/24/24 0735    ondansetron disintegrating tablet 8 mg  8 mg Oral Q8H PRN Gerald Quesada MD        propofol (DIPRIVAN) 10 mg/mL infusion  0-50 mcg/kg/min Intravenous Continuous Susanna Strauss MD 23.2 mL/hr at 05/24/24 0709 45 mcg/kg/min at 05/24/24 0709    sodium chloride 0.9% flush 10 mL  10 mL Intravenous PRN Gerald Quesada MD           Review of Systems   Unable to perform ROS: Intubated     Objective:     Vital Signs (Most Recent):  Temp: 97.6 °F (36.4 °C) (05/24/24 0400)  Pulse: 88 (05/24/24 1018)  Resp: 20 (05/24/24 1018)  BP: (!) 177/87 (05/24/24 0735)  SpO2: 99 % (05/24/24 1018) Vital Signs (24h Range):  Temp:  [97.6 °F (36.4 °C)-98.6 °F (37 °C)] 97.6 °F (36.4 °C)  Pulse:  [55-96] 88  Resp:  [13-23] 20  SpO2:  [96 %-100 %] 99 %  BP: (118-188)/() 177/87     Weight: 84.9 kg (187 lb 2.7 oz)  Body mass index is 26.86 kg/m².     Physical Exam  Vitals and nursing note reviewed.   Constitutional:       General: He is not in acute distress.     Appearance: He is not toxic-appearing.      Interventions: He is intubated.   HENT:      Head: Normocephalic.   Eyes:      Pupils: Pupils are equal, round, and reactive to light.   Cardiovascular:      Rate and Rhythm: Normal rate.    Pulmonary:      Effort: He is intubated.   Musculoskeletal:         General: Normal range of motion.      Cervical back: Normal range of motion.      Right lower leg: No edema.      Left lower leg: No edema.   Skin:     General: Skin is warm and dry.      Capillary Refill: Capillary refill takes less than 2 seconds.   Neurological:      Mental Status: He is alert.      Comments:     Limited PE 2/2 clinical condition  -intubated, not currently on sedation  -he opens his eyes to voice, appears to have right sided neglect, will not cross midline to look to the right  -spontaneous movement LUE, LLE  -withdraws from painful stim RUE, RLE  -PERR  -does not consistently follow commands  -receptive aphasia           NEUROLOGICAL EXAMINATION:     CRANIAL NERVES     CN III, IV, VI   Pupils are equal, round, and reactive to light.      Significant Labs:   Recent Lab Results  (Last 5 results in the past 24 hours)        05/24/24  0525   05/24/24  0440   05/24/24  0027   05/24/24  0011   05/24/24  0005        Albumin/Globulin Ratio 1.3               Albumin 3.5               ALP 44               ALT 16               Anion Gap 7.0               PTT       24.3  Comment: For Minimal Heparin Infusion, the goal aPTT 64-85 seconds corresponds to an anti-Xa of 0.3-0.5.    For Low Intensity and High Intensity Heparin, the goal aPTT  seconds corresponds to an anti-Xa of 0.3-0.7         AST 15               Baso # 0.04               Basophil % 0.4               BILIRUBIN TOTAL 0.2               BUN 19.5               BUN/CREAT RATIO 23               Calcium 8.4               Chloride 109               CO2 20               Creatinine 0.85               eGFR >60               Eos # 0.05               Eos % 0.5               FiO2         21       Globulin, Total 2.6               Glucose 103               Hematocrit 39.2               Hemoglobin 13.1               Immature Grans (Abs) 0.04               Immature Granulocytes 0.4                INR       0.9         Lymph # 0.89               LYMPH % 8.5               MCH 31.4               MCHC 33.4               MCV 94.0               Mono # 0.33               Mono % 3.1               MPV 9.7               Neut # 9.15               Neut % 87.1               nRBC 0.0               Platelet Count 232               POC BE         -2       POC Glucose     94           POC HCO3         22.8       POC PCO2         39.0       POC PH         7.374       POC PO2         77       POC SATURATED O2         95       POC TCO2         24       POCT Glucose   103             Potassium 4.6               PROTEIN TOTAL 6.1               PT       9.7         RBC 4.17               RDW 13.9               Sample         ARTERIAL       Sodium 136               WBC 10.50                                      Significant Imaging: I have reviewed all pertinent imaging results/findings within the past 24 hours.

## 2024-05-24 NOTE — CONSULTS
Interventional Neurology Consult Note    Consult Requested by:  Dr Jaylon Zapien [facility]  Reason for Consult:  stroke  SUBJECTIVE:     History of Present Illness:  Patient is a 63 y.o. male presents with aphasia, left gaze, right hemiparesis. LKN 1700 when he went to take a nap. Family noted around 2200 that he was not talking and weak on right side and went to OSH ER. His imaging showed early ischemic changes in the left MCA territory and left MCA occlusion. He was transferred for intervention. Not a candidate for thrombolytic due to outside the window. No medical history available at this time.     Review of patient's allergies indicates:  No Known Allergies    No past medical history on file.  No past surgical history on file.  No family history on file.       Review of Systems:  *ROS*    OBJECTIVE:     Vital Signs:  Temp:  [98.6 °F (37 °C)]   Pulse:  [63-96]   Resp:  [13-22]   BP: (148-188)/()   SpO2:  [96 %-99 %]     Physical Exam:  Drowsy but opens eyes to stimulus  Left gaze deviation, right facial droop, right hemianopia.  Mute, does not follow verbal commands   RUE minimal movement noted distally  RLE flaccid  LUE, LLE antigravity  No response to pain on right side    NIHSS 25    Unknown baseline mRS    Laboratory:  I have reviewed all pertinent lab results within the past 24 hours.    Diagnostic Results:  CTH & CTA left MCA territory stroke with left MCA occlusion and left ICA stenosis    ASSESSMENT/PLAN:     63 M with left MCA occlusion transferred from OSH for intervention. LKN 1700. Not a candidate for TNK.     emergent thrombectomy is indicated due to severe disabling symptoms. No family available for consent.     Patient underwent mechanical thrombectomy and carotid angioplasty. No stent was deployed due to risk of hemorrhagic transformation.     Plan:  - admit in ICU for q1h neurochecks & groin checks  - extubate in AM  - CTH at 9 AM  - IV fluids @ 100 cc/hr  - start aspirin 300 mg rectal qd after  repeat CTH    Will follow in AM    Gerald Quesada MD  Vascular and Interventional Neurology

## 2024-05-24 NOTE — NURSING
Nurses Note -- 4 Eyes      5/24/2024   7:08 AM      Skin assessed during: Daily Assessment      [x] No Altered Skin Integrity Present    [x]Prevention Measures Documented      [] Yes- Altered Skin Integrity Present or Discovered   [] LDA Added if Not in Epic (Describe Wound)   [] New Altered Skin Integrity was Present on Admit and Documented in LDA   [] Wound Image Taken    Wound Care Consulted? No    Attending Nurse:  Denisse Aguiar RN/Staff Member:  MANFRED Collins

## 2024-05-24 NOTE — PROGRESS NOTES
[facility] - 7th Floor ICU  Neurology  Progress Note    Patient Name: Robert Degroot  MRN: [id number]  Admission Date: 5/24/2024  Hospital Length of Stay: 0 days  Code Status: Full Code   Attending Provider: MILADY Rai MD  Primary Care Physician: Maria Del Rosario, Primary Doctor   Principal Problem:<principal problem not specified>      Subjective:     Interval History:   No new issues, remains intubated, neurologically unchanged.     Current Neurological Medications:     Current Facility-Administered Medications   Medication Dose Route Frequency Provider Last Rate Last Admin    0.9%  NaCl infusion   Intravenous Continuous Gerald Quesada  mL/hr at 05/24/24 0709 Rate Verify at 05/24/24 0709    acetaminophen tablet 650 mg  650 mg Oral Q4H PRN Gerald Quesada MD        atorvastatin tablet 80 mg  80 mg Oral Daily Susanna Strauss MD        clevidipine (CLEVIPREX) 25 mg/50 mL infusion  0-32 mg/hr Intravenous Continuous Ricky Rodas MD        famotidine tablet 20 mg  20 mg Oral Daily Susanna Strauss MD        hydrALAZINE injection 10 mg  10 mg Intravenous Q6H PRN Susanna Strauss MD   10 mg at 05/24/24 0735    ondansetron disintegrating tablet 8 mg  8 mg Oral Q8H PRN Gerald Quesada MD        propofol (DIPRIVAN) 10 mg/mL infusion  0-50 mcg/kg/min Intravenous Continuous Susanna Strauss MD 23.2 mL/hr at 05/24/24 0709 45 mcg/kg/min at 05/24/24 0709    sodium chloride 0.9% flush 10 mL  10 mL Intravenous PRN Gerald Quesada MD           Review of Systems   Unable to perform ROS: Intubated     Objective:     Vital Signs (Most Recent):  Temp: 97.6 °F (36.4 °C) (05/24/24 0400)  Pulse: 88 (05/24/24 1018)  Resp: 20 (05/24/24 1018)  BP: (!) 177/87 (05/24/24 0735)  SpO2: 99 % (05/24/24 1018) Vital Signs (24h Range):  Temp:  [97.6 °F (36.4 °C)-98.6 °F (37 °C)] 97.6 °F (36.4 °C)  Pulse:  [55-96] 88  Resp:  [13-23] 20  SpO2:  [96 %-100 %] 99 %  BP: (118-188)/() 177/87     Weight: 84.9 kg (187 lb 2.7  oz)  Body mass index is 26.86 kg/m².     Physical Exam  Vitals and nursing note reviewed.   Constitutional:       General: He is not in acute distress.     Appearance: He is not toxic-appearing.      Interventions: He is intubated.   HENT:      Head: Normocephalic.   Eyes:      Pupils: Pupils are equal, round, and reactive to light.   Cardiovascular:      Rate and Rhythm: Normal rate.   Pulmonary:      Effort: He is intubated.   Musculoskeletal:         General: Normal range of motion.      Cervical back: Normal range of motion.      Right lower leg: No edema.      Left lower leg: No edema.   Skin:     General: Skin is warm and dry.      Capillary Refill: Capillary refill takes less than 2 seconds.   Neurological:      Mental Status: He is alert.      Comments:     Limited PE 2/2 clinical condition  -intubated, not currently on sedation  -he opens his eyes to voice, appears to have right sided neglect, will not cross midline to look to the right  -spontaneous movement LUE, LLE  -withdraws from painful stim RUE, RLE  -PERR  -does not consistently follow commands  -receptive aphasia           NEUROLOGICAL EXAMINATION:     CRANIAL NERVES     CN III, IV, VI   Pupils are equal, round, and reactive to light.      Significant Labs:   Recent Lab Results  (Last 5 results in the past 24 hours)        05/24/24  0525   05/24/24  0440   05/24/24  0027   05/24/24  0011   05/24/24  0005        Albumin/Globulin Ratio 1.3               Albumin 3.5               ALP 44               ALT 16               Anion Gap 7.0               PTT       24.3  Comment: For Minimal Heparin Infusion, the goal aPTT 64-85 seconds corresponds to an anti-Xa of 0.3-0.5.    For Low Intensity and High Intensity Heparin, the goal aPTT  seconds corresponds to an anti-Xa of 0.3-0.7         AST 15               Baso # 0.04               Basophil % 0.4               BILIRUBIN TOTAL 0.2               BUN 19.5               BUN/CREAT RATIO 23                Calcium 8.4               Chloride 109               CO2 20               Creatinine 0.85               eGFR >60               Eos # 0.05               Eos % 0.5               FiO2         21       Globulin, Total 2.6               Glucose 103               Hematocrit 39.2               Hemoglobin 13.1               Immature Grans (Abs) 0.04               Immature Granulocytes 0.4               INR       0.9         Lymph # 0.89               LYMPH % 8.5               MCH 31.4               MCHC 33.4               MCV 94.0               Mono # 0.33               Mono % 3.1               MPV 9.7               Neut # 9.15               Neut % 87.1               nRBC 0.0               Platelet Count 232               POC BE         -2       POC Glucose     94           POC HCO3         22.8       POC PCO2         39.0       POC PH         7.374       POC PO2         77       POC SATURATED O2         95       POC TCO2         24       POCT Glucose   103             Potassium 4.6               PROTEIN TOTAL 6.1               PT       9.7         RBC 4.17               RDW 13.9               Sample         ARTERIAL       Sodium 136               WBC 10.50                                      Significant Imaging: I have reviewed all pertinent imaging results/findings within the past 24 hours.  Assessment and Plan:     Acute cerebrovascular accident (CVA) due to occlusion of left middle cerebral artery  -63 M with left MCA occlusion transferred from OSH for intervention. LKN 1700. Not a candidate for TNK.        Patient underwent mechanical thrombectomy and carotid angioplasty. No stent was deployed due to risk of hemorrhagic transformation.      -CT head:   Loss of gray-white matter differentiation in the left MCA vascular territory as detailed above, concordant with acute ischemic infarct.  Subtle foci of hyperattenuation are noted within the left basal ganglia, left insular ribbon, and scattered in the left  frontoparietal cortex which is indeterminate and could represent contrast staining versus petechial hemorrhage.  No evidence of gross hemorrhagic transformation.     Trace left-to-right midline shift.      Plan:  - q1h neurochecks   - hopefully he can be extubated this morning   - repeat CT head appears stable with evidence of left MCA infarct and petechial hemorrhage vs contrast  - IV fluids @ 100 cc/hr  - start aspirin 300 mg rectal qd after repeat CTH  - -160  - therapy evals tomorrow   - continue stroke workup          Further recommendations to follow from MD    VTE Risk Mitigation (From admission, onward)           Ordered     Reason for No Pharmacological VTE Prophylaxis  Once        Question:  Reasons:  Answer:  Risk of Bleeding    05/24/24 1140     IP VTE HIGH RISK PATIENT  Once         05/24/24 1140     Place sequential compression device  Until discontinued         05/24/24 1140                    Radha Stiles NP  Neurology  [facility] - 7th Floor ICU

## 2024-05-24 NOTE — ANESTHESIA PROCEDURE NOTES
Intubation    Date/Time: 5/24/2024 2:39 AM    Performed by: Rosemary Jiménez CRNA  Authorized by: Chi Rolon MD    Intubation:     Induction:  Rapid sequence induction    Intubated:  Postinduction    Mask Ventilation:  Not attempted    Attempts:  1    Attempted By:  CRNA    Method of Intubation:  Direct    Blade:  Hall 2    Laryngeal View Grade: Grade I - full view of cords      Difficult Airway Encountered?: No      Complications:  None    Airway Device:  Oral endotracheal tube    Airway Device Size:  7.5    Style/Cuff Inflation:  Cuffed (inflated to minimal occlusive pressure)    Tube secured:  23    Secured at:  The lips    Placement Verified By:  Capnometry    Complicating Factors:  None    Findings Post-Intubation:  BS equal bilateral and atraumatic/condition of teeth unchanged

## 2024-05-24 NOTE — H&P
[facility] - Cath Lab Services  Pulmonary Critical Care Note    Patient Name: Robert Degroot  MRN: [id number]  Admission Date: 5/24/2024  Hospital Length of Stay: 0 days  Code Status: Full Code  Attending Provider: MILADY Rai MD  Primary Care Provider: No primary care provider on file.     Subjective:     HPI:   Mr. Degroot is a 62 y/o male with unknown past medical history who presented to [facility] as a transfer from [facility] for mechanical thrombectomy secondary to acute CVA.  Per chart review it appears patient attempted to take a nap earlier in the day after which he woke up experiencing hemineglect, aphasia, and right-sided agnosia with right-sided flaccid paralysis and facial droop. CTA head and neck was performed upon arrival to ED showing a non-opacification of the proximal to distal M1 segment of the left MCA suggesting occlusion or severe stenosis.  Last known normal was approx 1700 and patient was not in window for TPA. He arrives to the ICU s/p successful mechanical thrombectomy and carotid angioplasty.  Further history is limited secondary to intubation and sedation.     Hospital Course/Significant events:  5/24/2024: Patient admitted to the ICU s/p mechanical thrombectomy and carotid angioplasty.      24 Hour Interval History:  Continue to monitor     No past medical history on file.    No past surgical history on file.    Social History     Socioeconomic History    Marital status:            No current outpatient medications    Current Inpatient Medications      Current Intravenous Infusions   sodium chloride 0.9%   Intravenous Continuous             Review of Systems   Reason unable to perform ROS: Intubation and sedation.          Objective:       Intake/Output Summary (Last 24 hours) at 5/24/2024 0338  Last data filed at 5/24/2024 0332  Gross per 24 hour   Intake 500 ml   Output 5 ml   Net 495 ml         Vital Signs (Most Recent):     There is no height or weight on file to  calculate BMI.    Vital Signs (24h Range):  Temp:  [98.6 °F (37 °C)] 98.6 °F (37 °C)  Pulse:  [63-96] 80  Resp:  [13-22] 17  SpO2:  [96 %-99 %] 98 %  BP: (148-188)/() 179/86     Physical Exam  Constitutional:       Appearance: He is ill-appearing.   HENT:      Head: Normocephalic and atraumatic.   Eyes:      Pupils: Pupils are equal, round, and reactive to light.   Cardiovascular:      Rate and Rhythm: Normal rate and regular rhythm.      Pulses: Normal pulses.      Heart sounds: Normal heart sounds. No murmur heard.  Pulmonary:      Breath sounds: No wheezing, rhonchi or rales.      Comments: ET tube in place on mechanical ventilation   Abdominal:      General: Bowel sounds are normal. There is no distension.      Palpations: Abdomen is soft.   Musculoskeletal:         General: No swelling.   Skin:     General: Skin is warm and dry.      Findings: No bruising or lesion.   Neurological:      Comments: Exam limited secondary to intubation and sedation   Patient able to spontaneously move all extremities.  Withdrawals to pain in all extremities.              Lines/Drains/Airways       Airway  Duration                  Airway - Non-Surgical 05/24/24 0239 <1 day              Peripheral Intravenous Line  Duration                  Peripheral IV - Single Lumen 18 G Left Antecubital -- days         Peripheral IV - Single Lumen 18 G Posterior;Right Hand -- days                    Significant Labs:    Lab Results   Component Value Date    WBC 8.19 05/23/2024    HGB 14.1 05/23/2024    HCT 42.1 05/23/2024    MCV 92.3 05/23/2024     05/23/2024         BMP  Lab Results   Component Value Date     05/23/2024    K 4.4 05/23/2024    CHLORIDE 108 (H) 05/23/2024    CO2 21 (L) 05/23/2024    BUN 20.0 05/23/2024    CREATININE 1.10 05/23/2024    CALCIUM 9.8 05/23/2024    AGAP 11.0 05/23/2024    EGFRNONAA >60 08/30/2021       ABG  Recent Labs   Lab 05/24/24  0005   PH 7.374   PO2 77*   PCO2 39.0   HCO3 22.8*   BE -2        Mechanical Ventilation Support:       Significant Imaging:  I have reviewed the pertinent imaging within the past 24 hours.        Assessment/Plan:     Assessment  CVA with left MCA occlusion s/p emergent mechanical thrombectomy and carotid angioplasty   HLD, HTN    Plan  -Admitted to the ICU for close monitoring and medical management   -neurology consulted, appreciate recommendations.   -continue mechanical ventilation, can likely extubate in AM.  Wean as tolerated to maintain spO2 >92%  -Repeat CTH at 9AM   -q1hr neuro checks  -echo ordered for further evaluation   -can start aspirin 300mg rectal qd after repeat CTH   -monitor electrolytes and replete as needed      DVT Prophylaxis: SCD's  GI Prophylaxis: Pepcid      32 minutes of critical care was time spent personally by me on the following activities: development of treatment plan with patient or surrogate and bedside caregivers, discussions with consultants, evaluation of patient's response to treatment, examination of patient, ordering and performing treatments and interventions, ordering and review of laboratory studies, ordering and review of radiographic studies, pulse oximetry, re-evaluation of patient's condition.  This critical care time did not overlap with that of any other provider or involve time for any procedures.     Susanna Strauss MD  Pulmonary Critical Care Medicine  [facility] - Cath Lab Services

## 2024-05-24 NOTE — BRIEF OP NOTE
Name: Robert Degroot  MRN: [id number]  Age: 63 y.o.  Gender: male    Date of Procedure: 05/24/2024    Pre-operative Diagnosis: left MCA occlusion    Post-operative Diagnosis: Left MCA-ICA tandem occlusion    Procedure: Cerebral angiogram with mechanical thrombectomy and carotid balloon angioplasty    Access/Closure: Right femoral artery access closed with angioseal    Surgeon: Gerald Quesada MD    Anesthesia: GA    EBL: min ml    Description of findings:  - tandem left ICA and left MCA occlusion  - carotid balloon angioplasty  - successful mechanical thrombectomy with TICI 3 recanalization    Patient condition:   stable    Implant/specimen(s):  none    Plan:  - admit in ICU for q1h neurochecks & groin checks  - extubate in AM  - CTH at 9 AM  - IV fluids @ 100 cc/hr  - start aspirin 300 mg rectal qd after repeat CTH    Gerald Quesada MD  Interventional Neurology

## 2024-05-24 NOTE — SEDATION DOCUMENTATION
Called [facility] for transport update-I was told they were arriving at [facility] to  patient; Dr Quesada and anesthesia updated

## 2024-05-24 NOTE — PT/OT/SLP PROGRESS
Orders received per neuro services, chart reviewed, POC discussed with nursing.  Pt remains intubated, plans to extubate today.  SLP to follow.

## 2024-05-24 NOTE — ASSESSMENT & PLAN NOTE
-63 M with left MCA occlusion transferred from OSH for intervention. LKN 1700. Not a candidate for TNK.        Patient underwent mechanical thrombectomy and carotid angioplasty. No stent was deployed due to risk of hemorrhagic transformation.      -CT head:   Loss of gray-white matter differentiation in the left MCA vascular territory as detailed above, concordant with acute ischemic infarct.  Subtle foci of hyperattenuation are noted within the left basal ganglia, left insular ribbon, and scattered in the left frontoparietal cortex which is indeterminate and could represent contrast staining versus petechial hemorrhage.  No evidence of gross hemorrhagic transformation.     Trace left-to-right midline shift.      Plan:  - q1h neurochecks   - hopefully he can be extubated this morning   - repeat CT head appears stable with evidence of left MCA infarct and petechial hemorrhage vs contrast  - IV fluids @ 100 cc/hr  - start aspirin 300 mg rectal qd after repeat CTH  - -160  - therapy evals tomorrow   - continue stroke workup

## 2024-05-24 NOTE — TRANSFER OF CARE
Anesthesia Transfer of Care Note    Patient: Robert Degroot    Procedure(s) Performed: * No procedures listed *    Patient location: Cath Lab    Anesthesia Type: general    Transport from OR: Transported from OR intubated on 100% O2 by AMBU with assisted ventilation. Continuous ECG monitoring in transport. Continuous SpO2 monitoring in transport    Post pain: adequate analgesia    Post assessment: no apparent anesthetic complications    Post vital signs: stable    Level of consciousness: sedated    Nausea/Vomiting: no nausea/vomiting    Complications: none    Transfer of care protocol was followedComments: Care transferred to ICU RN and respiratory therapist

## 2024-05-24 NOTE — OP NOTE
[facility]                       [address] [place] [address]     PATIENT NAME:Robert Degroot    YOB: 1960      DATE OF SURGERY: 5/24/2024      SURGEON:  Gerald Quesada MD     PREOPERATIVE DIAGNOSIS:  left MCA occlusion     POSTOPERATIVE DIAGNOSIS:  left ICA-MCA tandem occlusion     PROCEDURE PERFORMED:    Cerebral angiogram with catheter insertion in the following arteries:  left common carotid, left internal carotid, left middle cerebral artery  Interpretation of images  Balloon angioplasty of left internal carotid artery  Mechanical thrombectomy using aspiration  Right groin closure with Angio-Seal device       LEVEL OF SEDATION:  General anesthesia      TOTAL INTRASERVICE SEDATION TIME:  30 minutes.     COMPLICATIONS:  None.    APPROACH:  Right femoral artery    VESSELS SELECTIVELY CATHETERIZED:   Left common carotid artery  Left internal carotid artery   Left middle cerebral artery    DEVICES USED:  8 Fr short sheath  Guide catheter:  Zoom 88  Aspiration catheter : Zoom 71    035 glidewire  Juan 24 & 14 microwire  Aviator 5.5 x 40 mm balloon  Angio-seal  Micropuncture kit       INDICATION:  63 y.o. years old male presents with right-sided weakness, aphasia, left gaze deviation.  Noninvasive imaging demonstrated left MCA occlusion and favorable imaging profile.  Given the severity of stroke symptoms, imaging findings and NIHSS 25 thrombectomy was indicated.  No family available for consent. Emergency consent obtained.     DETAILED DESCRIPTION:      Patient/family provided informed consent.  Patient supine on the angio table, groin prepped and draped in routine fashion.  General anesthesia was administered.     Under the ultrasound guidance, the right femoral artery was accessed using a micropuncture kit and an arterial sheath was placed using the modified Seldinger technique. Permanent image was stored. Guide  catheter telescoping over select catheter and advanced to the arch.  The left common carotid artery was selectively catheterized.  The guide catheter was advanced to the common carotid artery over the Glidewire.  The initial angiogram demonstrated a left ICA near occlusion.    Using the above balloon over the microwire, the stenotic segment was crossed and progressive balloon angioplasty was performed. He went into Asystole for 20 seconds during the angioplasty and required atropine. Following atropine administration, the HR was in 70s. Decision was made to not deploy a stent as he had some changes on his initial CTH concerning for a large left MCA infarct.     Following angioplasty, the guide catheter was advanced to distal cervical ICA over the microwire. The cranial injection demonstrated left MCA occlusion at the origin.     1st Pass:  Zoom 71 aspiration catheter was introduced in the guide catheter and advanced to the clot over the microwire. Continuous pump aspiration from the aspiration catheter along with manual aspiration from the guide catheter was performed. Significant pieces of clot was retrieved in the aspiration system. The subsequent injection demonstrated successful TICI 3 recanalization.     The final cervical injections demonstrated patent left ICA with moderate stenosis at the carotid bulb due to underlying atherosclerotic disease.     The catheters and sheath were removed and hemostasis was achieved with angioseal closure device.  There were no signs of hematoma, hemorrhage or loss of distal pulses.  The vitals remained stable during the procedure and the patient was subsequently transferred to the ICU.  No immediate complications were noted.    Angiograms performed and findings:    Right femoral artery:  Sheath placement in the right common femoral artery above the femoral bifurcation.  There is no evidence of stenosis, dissection, atherosclerosis or contrast extravasation at the site of  puncture.  Left common carotid artery - cervical:  Unremarkable left common carotid. The left ICA is nearly occluded with trickle flow in the cervical segment.   Left CCA - cervical: following balloon angioplasty in the left ICA, there is significant improvement in the caliber of left ICA with moderate residual stenosis.   Left internal carotid artery - cerebral:  The distal cervical, petrous, cavernous, supraclinoid segments of the left ICA appear patent.  There is a posterior communicating artery supplying the PCA territory.  There is occlusion of the left MCA noted on the initial injection.  After the 1st pass with aspiration, there is TICI3 recanalization achieved.  There is no evidence contrast extravasation or vessel injury noted.        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

## 2024-05-25 ENCOUNTER — ANESTHESIA EVENT (OUTPATIENT)
Dept: SURGERY | Facility: HOSPITAL | Age: 64
DRG: 023 | End: 2024-05-25
Payer: COMMERCIAL

## 2024-05-25 ENCOUNTER — ANESTHESIA (OUTPATIENT)
Dept: SURGERY | Facility: HOSPITAL | Age: 64
DRG: 023 | End: 2024-05-25
Payer: COMMERCIAL

## 2024-05-25 LAB
ALBUMIN SERPL-MCNC: 3.6 G/DL (ref 3.4–4.8)
ALBUMIN/GLOB SERPL: 1.2 RATIO (ref 1.1–2)
ALP SERPL-CCNC: 44 UNIT/L (ref 40–150)
ALT SERPL-CCNC: 15 UNIT/L (ref 0–55)
ANION GAP SERPL CALC-SCNC: 10 MEQ/L
ANION GAP SERPL CALC-SCNC: 9 MEQ/L
AST SERPL-CCNC: 15 UNIT/L (ref 5–34)
AV INDEX (PROSTH): 0.75
AV MEAN GRADIENT: 6 MMHG
AV PEAK GRADIENT: 11 MMHG
AV VALVE AREA BY VELOCITY RATIO: 2.12 CM²
AV VALVE AREA: 2.34 CM²
AV VELOCITY RATIO: 0.67
BASOPHILS # BLD AUTO: 0.02 X10(3)/MCL
BASOPHILS NFR BLD AUTO: 0.1 %
BILIRUB SERPL-MCNC: 0.3 MG/DL
BSA FOR ECHO PROCEDURE: 2.05 M2
BUN SERPL-MCNC: 10.2 MG/DL (ref 8.4–25.7)
BUN SERPL-MCNC: 11.4 MG/DL (ref 8.4–25.7)
CALCIUM SERPL-MCNC: 8.6 MG/DL (ref 8.8–10)
CALCIUM SERPL-MCNC: 8.6 MG/DL (ref 8.8–10)
CHLORIDE SERPL-SCNC: 109 MMOL/L (ref 98–107)
CHLORIDE SERPL-SCNC: 111 MMOL/L (ref 98–107)
CO2 SERPL-SCNC: 20 MMOL/L (ref 23–31)
CO2 SERPL-SCNC: 21 MMOL/L (ref 23–31)
CREAT SERPL-MCNC: 0.74 MG/DL (ref 0.73–1.18)
CREAT SERPL-MCNC: 0.76 MG/DL (ref 0.73–1.18)
CREAT/UREA NIT SERPL: 14
CREAT/UREA NIT SERPL: 15
CV ECHO LV RWT: 0.5 CM
DOP CALC AO PEAK VEL: 1.63 M/S
DOP CALC AO VTI: 32.2 CM
DOP CALC LVOT AREA: 3.1 CM2
DOP CALC LVOT DIAMETER: 2 CM
DOP CALC LVOT PEAK VEL: 1.1 M/S
DOP CALC LVOT STROKE VOLUME: 75.36 CM3
DOP CALC MV VTI: 40 CM
DOP CALCLVOT PEAK VEL VTI: 24 CM
E WAVE DECELERATION TIME: 222 MSEC
E/A RATIO: 0.95
E/E' RATIO: 7.65 M/S
ECHO LV POSTERIOR WALL: 0.98 CM (ref 0.6–1.1)
EOSINOPHIL # BLD AUTO: 0 X10(3)/MCL (ref 0–0.9)
EOSINOPHIL NFR BLD AUTO: 0 %
ERYTHROCYTE [DISTWIDTH] IN BLOOD BY AUTOMATED COUNT: 13.9 % (ref 11.5–17)
FRACTIONAL SHORTENING: 31 % (ref 28–44)
GFR SERPLBLD CREATININE-BSD FMLA CKD-EPI: >60 ML/MIN/1.73/M2
GFR SERPLBLD CREATININE-BSD FMLA CKD-EPI: >60 ML/MIN/1.73/M2
GLOBULIN SER-MCNC: 2.9 GM/DL (ref 2.4–3.5)
GLUCOSE SERPL-MCNC: 141 MG/DL (ref 82–115)
GLUCOSE SERPL-MCNC: 146 MG/DL (ref 82–115)
HCT VFR BLD AUTO: 38.8 % (ref 42–52)
HGB BLD-MCNC: 13.1 G/DL (ref 14–18)
HR MV ECHO: 93 BPM
IMM GRANULOCYTES # BLD AUTO: 0.04 X10(3)/MCL (ref 0–0.04)
IMM GRANULOCYTES NFR BLD AUTO: 0.3 %
INTERVENTRICULAR SEPTUM: 0.91 CM (ref 0.6–1.1)
LEFT ATRIUM SIZE: 3.6 CM
LEFT ATRIUM VOLUME INDEX MOD: 19.5 ML/M2
LEFT ATRIUM VOLUME MOD: 39.5 CM3
LEFT INTERNAL DIMENSION IN SYSTOLE: 2.7 CM (ref 2.1–4)
LEFT VENTRICLE DIASTOLIC VOLUME INDEX: 32.46 ML/M2
LEFT VENTRICLE DIASTOLIC VOLUME: 65.9 ML
LEFT VENTRICLE MASS INDEX: 56 G/M2
LEFT VENTRICLE SYSTOLIC VOLUME INDEX: 13.3 ML/M2
LEFT VENTRICLE SYSTOLIC VOLUME: 27 ML
LEFT VENTRICULAR INTERNAL DIMENSION IN DIASTOLE: 3.9 CM (ref 3.5–6)
LEFT VENTRICULAR MASS: 112.74 G
LV LATERAL E/E' RATIO: 6.29 M/S
LV SEPTAL E/E' RATIO: 9.78 M/S
LVOT MG: 3 MMHG
LVOT MV: 0.76 CM/S
LYMPHOCYTES # BLD AUTO: 1.04 X10(3)/MCL (ref 0.6–4.6)
LYMPHOCYTES NFR BLD AUTO: 7.2 %
MCH RBC QN AUTO: 31 PG (ref 27–31)
MCHC RBC AUTO-ENTMCNC: 33.8 G/DL (ref 33–36)
MCV RBC AUTO: 91.7 FL (ref 80–94)
MONOCYTES # BLD AUTO: 1.36 X10(3)/MCL (ref 0.1–1.3)
MONOCYTES NFR BLD AUTO: 9.4 %
MV MEAN GRADIENT: 2 MMHG
MV PEAK A VEL: 0.93 M/S
MV PEAK E VEL: 0.88 M/S
MV PEAK GRADIENT: 6 MMHG
MV STENOSIS PRESSURE HALF TIME: 74 MS
MV VALVE AREA BY CONTINUITY EQUATION: 1.88 CM2
MV VALVE AREA P 1/2 METHOD: 2.97 CM2
NEUTROPHILS # BLD AUTO: 11.97 X10(3)/MCL (ref 2.1–9.2)
NEUTROPHILS NFR BLD AUTO: 83 %
NRBC BLD AUTO-RTO: 0 %
OHS LV EJECTION FRACTION SIMPSONS BIPLANE MOD: 66 %
OHS QRS DURATION: 92 MS
OHS QRS DURATION: 94 MS
OHS QTC CALCULATION: 462 MS
OHS QTC CALCULATION: 469 MS
OSMOLALITY SERPL: 294 MOSM/KG (ref 280–300)
OSMOLALITY SERPL: 296 MOSM/KG (ref 280–300)
OSMOLALITY SERPL: 309 MOSM/KG (ref 280–300)
PLATELET # BLD AUTO: 236 X10(3)/MCL (ref 130–400)
PMV BLD AUTO: 9.7 FL (ref 7.4–10.4)
POCT GLUCOSE: 131 MG/DL (ref 70–110)
POTASSIUM SERPL-SCNC: 4 MMOL/L (ref 3.5–5.1)
POTASSIUM SERPL-SCNC: 4 MMOL/L (ref 3.5–5.1)
PROT SERPL-MCNC: 6.5 GM/DL (ref 5.8–7.6)
RBC # BLD AUTO: 4.23 X10(6)/MCL (ref 4.7–6.1)
SINUS: 3.3 CM
SODIUM SERPL-SCNC: 139 MMOL/L (ref 136–145)
SODIUM SERPL-SCNC: 139 MMOL/L (ref 136–145)
SODIUM SERPL-SCNC: 141 MMOL/L (ref 136–145)
SODIUM SERPL-SCNC: 143 MMOL/L (ref 136–145)
SODIUM SERPL-SCNC: 145 MMOL/L (ref 136–145)
TDI LATERAL: 0.14 M/S
TDI SEPTAL: 0.09 M/S
TDI: 0.12 M/S
TRICUSPID ANNULAR PLANE SYSTOLIC EXCURSION: 2.43 CM
WBC # SPEC AUTO: 14.43 X10(3)/MCL (ref 4.5–11.5)
Z-SCORE OF LEFT VENTRICULAR DIMENSION IN END DIASTOLE: -4.33
Z-SCORE OF LEFT VENTRICULAR DIMENSION IN END SYSTOLE: -2.46

## 2024-05-25 PROCEDURE — 27200966 HC CLOSED SUCTION SYSTEM

## 2024-05-25 PROCEDURE — 63600175 PHARM REV CODE 636 W HCPCS: Performed by: NURSE ANESTHETIST, CERTIFIED REGISTERED

## 2024-05-25 PROCEDURE — C9248 INJ, CLEVIDIPINE BUTYRATE: HCPCS | Performed by: INTERNAL MEDICINE

## 2024-05-25 PROCEDURE — 27100171 HC OXYGEN HIGH FLOW UP TO 24 HOURS

## 2024-05-25 PROCEDURE — 25000003 PHARM REV CODE 250: Performed by: NURSE PRACTITIONER

## 2024-05-25 PROCEDURE — D9220A PRA ANESTHESIA: Mod: ANES,,, | Performed by: ANESTHESIOLOGY

## 2024-05-25 PROCEDURE — 00N00ZZ RELEASE BRAIN, OPEN APPROACH: ICD-10-PCS | Performed by: NEUROLOGICAL SURGERY

## 2024-05-25 PROCEDURE — 87070 CULTURE OTHR SPECIMN AEROBIC: CPT | Performed by: NEUROLOGICAL SURGERY

## 2024-05-25 PROCEDURE — 36000711: Performed by: NEUROLOGICAL SURGERY

## 2024-05-25 PROCEDURE — A4216 STERILE WATER/SALINE, 10 ML: HCPCS | Performed by: PSYCHIATRY & NEUROLOGY

## 2024-05-25 PROCEDURE — 25000003 PHARM REV CODE 250: Performed by: PSYCHIATRY & NEUROLOGY

## 2024-05-25 PROCEDURE — 25000003 PHARM REV CODE 250: Performed by: INTERNAL MEDICINE

## 2024-05-25 PROCEDURE — 009630Z DRAINAGE OF CEREBRAL VENTRICLE WITH DRAINAGE DEVICE, PERCUTANEOUS APPROACH: ICD-10-PCS | Performed by: NEUROLOGICAL SURGERY

## 2024-05-25 PROCEDURE — 94761 N-INVAS EAR/PLS OXIMETRY MLT: CPT

## 2024-05-25 PROCEDURE — 84295 ASSAY OF SERUM SODIUM: CPT | Performed by: PSYCHIATRY & NEUROLOGY

## 2024-05-25 PROCEDURE — 99900031 HC PATIENT EDUCATION (STAT)

## 2024-05-25 PROCEDURE — 99233 SBSQ HOSP IP/OBS HIGH 50: CPT | Mod: ,,, | Performed by: PSYCHIATRY & NEUROLOGY

## 2024-05-25 PROCEDURE — 63600175 PHARM REV CODE 636 W HCPCS

## 2024-05-25 PROCEDURE — 25000003 PHARM REV CODE 250

## 2024-05-25 PROCEDURE — 83930 ASSAY OF BLOOD OSMOLALITY: CPT | Performed by: PSYCHIATRY & NEUROLOGY

## 2024-05-25 PROCEDURE — D9220A PRA ANESTHESIA: Mod: CRNA,,, | Performed by: NURSE ANESTHETIST, CERTIFIED REGISTERED

## 2024-05-25 PROCEDURE — 37000008 HC ANESTHESIA 1ST 15 MINUTES: Performed by: NEUROLOGICAL SURGERY

## 2024-05-25 PROCEDURE — 36620 INSERTION CATHETER ARTERY: CPT | Performed by: ANESTHESIOLOGY

## 2024-05-25 PROCEDURE — 36415 COLL VENOUS BLD VENIPUNCTURE: CPT | Performed by: PSYCHIATRY & NEUROLOGY

## 2024-05-25 PROCEDURE — 99900035 HC TECH TIME PER 15 MIN (STAT)

## 2024-05-25 PROCEDURE — 00U20KZ SUPPLEMENT DURA MATER WITH NONAUTOLOGOUS TISSUE SUBSTITUTE, OPEN APPROACH: ICD-10-PCS | Performed by: NEUROLOGICAL SURGERY

## 2024-05-25 PROCEDURE — 20000000 HC ICU ROOM

## 2024-05-25 PROCEDURE — 80053 COMPREHEN METABOLIC PANEL: CPT

## 2024-05-25 PROCEDURE — 36000710: Performed by: NEUROLOGICAL SURGERY

## 2024-05-25 PROCEDURE — 99900026 HC AIRWAY MAINTENANCE (STAT)

## 2024-05-25 PROCEDURE — 94003 VENT MGMT INPAT SUBQ DAY: CPT

## 2024-05-25 PROCEDURE — 85025 COMPLETE CBC W/AUTO DIFF WBC: CPT

## 2024-05-25 PROCEDURE — 25000003 PHARM REV CODE 250: Performed by: NURSE ANESTHETIST, CERTIFIED REGISTERED

## 2024-05-25 PROCEDURE — 63600175 PHARM REV CODE 636 W HCPCS: Performed by: NEUROLOGICAL SURGERY

## 2024-05-25 PROCEDURE — 63600175 PHARM REV CODE 636 W HCPCS: Performed by: INTERNAL MEDICINE

## 2024-05-25 PROCEDURE — 94760 N-INVAS EAR/PLS OXIMETRY 1: CPT

## 2024-05-25 PROCEDURE — 25000003 PHARM REV CODE 250: Performed by: NEUROLOGICAL SURGERY

## 2024-05-25 PROCEDURE — 27201423 OPTIME MED/SURG SUP & DEVICES STERILE SUPPLY: Performed by: NEUROLOGICAL SURGERY

## 2024-05-25 PROCEDURE — 37000009 HC ANESTHESIA EA ADD 15 MINS: Performed by: NEUROLOGICAL SURGERY

## 2024-05-25 PROCEDURE — C1762 CONN TISS, HUMAN(INC FASCIA): HCPCS | Performed by: NEUROLOGICAL SURGERY

## 2024-05-25 PROCEDURE — 63600175 PHARM REV CODE 636 W HCPCS: Performed by: PSYCHIATRY & NEUROLOGY

## 2024-05-25 DEVICE — DURAMATRIX ONLAY PLUS 5X7: Type: IMPLANTABLE DEVICE | Site: BRAIN | Status: FUNCTIONAL

## 2024-05-25 RX ORDER — HYDROCODONE BITARTRATE AND ACETAMINOPHEN 5; 325 MG/1; MG/1
1 TABLET ORAL EVERY 4 HOURS PRN
Status: DISCONTINUED | OUTPATIENT
Start: 2024-05-25 | End: 2024-05-29 | Stop reason: HOSPADM

## 2024-05-25 RX ORDER — ACETAMINOPHEN 325 MG/1
650 TABLET ORAL EVERY 4 HOURS PRN
Status: DISCONTINUED | OUTPATIENT
Start: 2024-05-25 | End: 2024-05-29 | Stop reason: HOSPADM

## 2024-05-25 RX ORDER — FAMOTIDINE 10 MG/ML
20 INJECTION INTRAVENOUS EVERY 12 HOURS
Status: DISCONTINUED | OUTPATIENT
Start: 2024-05-25 | End: 2024-05-29 | Stop reason: HOSPADM

## 2024-05-25 RX ORDER — MUPIROCIN 20 MG/G
OINTMENT TOPICAL 2 TIMES DAILY
Status: DISCONTINUED | OUTPATIENT
Start: 2024-05-25 | End: 2024-05-29 | Stop reason: HOSPADM

## 2024-05-25 RX ORDER — CEFAZOLIN SODIUM 1 G/3ML
INJECTION, POWDER, FOR SOLUTION INTRAMUSCULAR; INTRAVENOUS
Status: DISCONTINUED | OUTPATIENT
Start: 2024-05-25 | End: 2024-05-28

## 2024-05-25 RX ORDER — ACETAMINOPHEN 650 MG/1
650 SUPPOSITORY RECTAL EVERY 4 HOURS PRN
Status: DISCONTINUED | OUTPATIENT
Start: 2024-05-25 | End: 2024-05-29 | Stop reason: HOSPADM

## 2024-05-25 RX ORDER — PHENYLEPHRINE HCL IN 0.9% NACL 1 MG/10 ML
SYRINGE (ML) INTRAVENOUS
Status: DISCONTINUED | OUTPATIENT
Start: 2024-05-25 | End: 2024-05-28

## 2024-05-25 RX ORDER — MANNITOL 250 MG/ML
100 INJECTION, SOLUTION INTRAVENOUS ONCE
Status: COMPLETED | OUTPATIENT
Start: 2024-05-25 | End: 2024-05-25

## 2024-05-25 RX ORDER — FENTANYL CITRATE 50 UG/ML
INJECTION, SOLUTION INTRAMUSCULAR; INTRAVENOUS
Status: DISCONTINUED | OUTPATIENT
Start: 2024-05-25 | End: 2024-05-28

## 2024-05-25 RX ORDER — SODIUM CHLORIDE 9 MG/ML
INJECTION, SOLUTION INTRAVENOUS CONTINUOUS
Status: DISCONTINUED | OUTPATIENT
Start: 2024-05-25 | End: 2024-05-26

## 2024-05-25 RX ORDER — CEFAZOLIN SODIUM 2 G/50ML
2 SOLUTION INTRAVENOUS
Status: COMPLETED | OUTPATIENT
Start: 2024-05-25 | End: 2024-05-26

## 2024-05-25 RX ORDER — PROCHLORPERAZINE EDISYLATE 5 MG/ML
5 INJECTION INTRAMUSCULAR; INTRAVENOUS EVERY 6 HOURS PRN
Status: DISCONTINUED | OUTPATIENT
Start: 2024-05-25 | End: 2024-05-29 | Stop reason: HOSPADM

## 2024-05-25 RX ORDER — LIDOCAINE HYDROCHLORIDE AND EPINEPHRINE 5; 5 MG/ML; UG/ML
INJECTION, SOLUTION INFILTRATION; PERINEURAL
Status: DISCONTINUED | OUTPATIENT
Start: 2024-05-25 | End: 2024-05-25 | Stop reason: HOSPADM

## 2024-05-25 RX ORDER — MIDAZOLAM HYDROCHLORIDE 2 MG/2ML
2 INJECTION, SOLUTION INTRAMUSCULAR; INTRAVENOUS ONCE
Status: COMPLETED | OUTPATIENT
Start: 2024-05-25 | End: 2024-05-25

## 2024-05-25 RX ORDER — ROCURONIUM BROMIDE 10 MG/ML
INJECTION, SOLUTION INTRAVENOUS
Status: DISCONTINUED | OUTPATIENT
Start: 2024-05-25 | End: 2024-05-28

## 2024-05-25 RX ORDER — CEFAZOLIN SODIUM 1 G/3ML
INJECTION, POWDER, FOR SOLUTION INTRAMUSCULAR; INTRAVENOUS
Status: DISCONTINUED | OUTPATIENT
Start: 2024-05-25 | End: 2024-05-25 | Stop reason: HOSPADM

## 2024-05-25 RX ORDER — ESMOLOL HYDROCHLORIDE 20 MG/ML
0-300 INJECTION, SOLUTION INTRAVENOUS CONTINUOUS
Status: DISCONTINUED | OUTPATIENT
Start: 2024-05-25 | End: 2024-05-25

## 2024-05-25 RX ORDER — ONDANSETRON HYDROCHLORIDE 2 MG/ML
4 INJECTION, SOLUTION INTRAVENOUS EVERY 6 HOURS PRN
Status: DISCONTINUED | OUTPATIENT
Start: 2024-05-25 | End: 2024-05-29 | Stop reason: HOSPADM

## 2024-05-25 RX ORDER — MORPHINE SULFATE 4 MG/ML
2 INJECTION, SOLUTION INTRAMUSCULAR; INTRAVENOUS EVERY 4 HOURS PRN
Status: DISCONTINUED | OUTPATIENT
Start: 2024-05-25 | End: 2024-05-29 | Stop reason: HOSPADM

## 2024-05-25 RX ADMIN — ASPIRIN 300 MG: 300 SUPPOSITORY RECTAL at 08:05

## 2024-05-25 RX ADMIN — CLEVIPIDINE 22 MG/HR: 0.5 EMULSION INTRAVENOUS at 08:05

## 2024-05-25 RX ADMIN — CLEVIPIDINE 4 MG/HR: 0.5 EMULSION INTRAVENOUS at 04:05

## 2024-05-25 RX ADMIN — HYDRALAZINE HYDROCHLORIDE 10 MG: 20 INJECTION INTRAMUSCULAR; INTRAVENOUS at 04:05

## 2024-05-25 RX ADMIN — CEFAZOLIN 2 G: 330 INJECTION, POWDER, FOR SOLUTION INTRAMUSCULAR; INTRAVENOUS at 02:05

## 2024-05-25 RX ADMIN — CLEVIPIDINE 16 MG/HR: 0.5 EMULSION INTRAVENOUS at 11:05

## 2024-05-25 RX ADMIN — CLEVIPIDINE 31 MG/HR: 0.5 EMULSION INTRAVENOUS at 05:05

## 2024-05-25 RX ADMIN — CLEVIPIDINE 7 MG/HR: 0.5 EMULSION INTRAVENOUS at 07:05

## 2024-05-25 RX ADMIN — SODIUM CHLORIDE: 9 INJECTION, SOLUTION INTRAVENOUS at 04:05

## 2024-05-25 RX ADMIN — Medication 100 MCG: at 02:05

## 2024-05-25 RX ADMIN — SODIUM CHLORIDE, PRESERVATIVE FREE 10 ML: 5 INJECTION INTRAVENOUS at 05:05

## 2024-05-25 RX ADMIN — FENTANYL CITRATE 100 MCG: 50 INJECTION, SOLUTION INTRAMUSCULAR; INTRAVENOUS at 02:05

## 2024-05-25 RX ADMIN — FAMOTIDINE 20 MG: 10 INJECTION, SOLUTION INTRAVENOUS at 08:05

## 2024-05-25 RX ADMIN — MIDAZOLAM HYDROCHLORIDE 2 MG: 1 INJECTION, SOLUTION INTRAMUSCULAR; INTRAVENOUS at 05:05

## 2024-05-25 RX ADMIN — SODIUM CHLORIDE, SODIUM GLUCONATE, SODIUM ACETATE, POTASSIUM CHLORIDE AND MAGNESIUM CHLORIDE: 526; 502; 368; 37; 30 INJECTION, SOLUTION INTRAVENOUS at 02:05

## 2024-05-25 RX ADMIN — CLEVIPIDINE 32 MG/HR: 0.5 EMULSION INTRAVENOUS at 04:05

## 2024-05-25 RX ADMIN — LEVETIRACETAM 500 MG: 100 INJECTION, SOLUTION INTRAVENOUS at 08:05

## 2024-05-25 RX ADMIN — SODIUM CHLORIDE, PRESERVATIVE FREE 10 ML: 5 INJECTION INTRAVENOUS at 12:05

## 2024-05-25 RX ADMIN — CLEVIPIDINE 7 MG/HR: 0.5 EMULSION INTRAVENOUS at 10:05

## 2024-05-25 RX ADMIN — ROCURONIUM BROMIDE 100 MG: 10 SOLUTION INTRAVENOUS at 02:05

## 2024-05-25 RX ADMIN — MUPIROCIN: 20 OINTMENT TOPICAL at 08:05

## 2024-05-25 RX ADMIN — MORPHINE SULFATE 2 MG: 4 INJECTION INTRAVENOUS at 05:05

## 2024-05-25 RX ADMIN — SODIUM CHLORIDE 60 ML/HR: 3 INJECTION, SOLUTION INTRAVENOUS at 04:05

## 2024-05-25 RX ADMIN — MANNITOL 100 G: 250 INJECTION, SOLUTION INTRAVENOUS at 02:05

## 2024-05-25 RX ADMIN — LABETALOL HYDROCHLORIDE 0.4 MG/MIN: 5 INJECTION, SOLUTION INTRAVENOUS at 09:05

## 2024-05-25 RX ADMIN — PROPOFOL 20 MCG/KG/MIN: 10 INJECTION, EMULSION INTRAVENOUS at 04:05

## 2024-05-25 RX ADMIN — LABETALOL HYDROCHLORIDE 0.05 MG/MIN: 5 INJECTION, SOLUTION INTRAVENOUS at 05:05

## 2024-05-25 NOTE — ANESTHESIA PREPROCEDURE EVALUATION
"                                                                                                             05/25/2024  Robert Degroot is a 63 y.o., male presents for emergent craniectomy s/p mechanical thrombectomy nearly 36 hours ago- pt intubated in ICU    "   Subjective:      HPI:   Mr. Degroot is a 64 y/o male with unknown past medical history who presented to [facility] as a transfer from [place] for mechanical thrombectomy secondary to acute CVA.  Per chart review it appears patient attempted to take a nap earlier in the day after which he woke up experiencing hemineglect, aphasia, and right-sided agnosia with right-sided flaccid paralysis and facial droop. CTA head and neck was performed upon arrival to ED showing a non-opacification of the proximal to distal M1 segment of the left MCA suggesting occlusion or severe stenosis.  Last known normal was approx 1700 and patient was not in window for TPA. He arrives to the ICU s/p successful mechanical thrombectomy and carotid angioplasty.  Further history is limited secondary to intubation and sedation.      Hospital Course/Significant events:  5/24/2024: Patient admitted to the ICU s/p mechanical thrombectomy and carotid angioplasty.       24 Hour Interval History:  Patient has been weaned to CPAP with pressure support.  He has a right-sided hemiparesis.  Case was discussed with Interventional Neurology earlier.  Remains on Cleviprex to achieve target blood pressures.  On hypertonic saline as well."         Pre-op Assessment          Review of Systems  FINDINGS:  There is an evolving large left MCA territory infarct with significant progression of parenchymal edema.  There is no definite new or residual intracranial hemorrhage identified.  There is increased mass effect with sulcal effacement, partial effacement of the left lateral ventricle and 1.3 cm of rightward midline shift.  There is dilatation of the right occipital horn with transependymal flow of CSF.  The basal " cisterns are partially effaced with impending right uncal herniation.  The calvarium and skull base are intact.     Impression:     1. Evolving left MCA territory infarct with significant increase in mass effect, rightward midline shift, impending uncal herniation and developing hydrocephalus.  2. No definite acute intracranial hemorrhage.  Finding given to Miguelina SHEARER at the time of dictation.        Electronically signed by:Celi Lanier  Date:                                            05/25/2024  Time:                                           13:37    Physical Exam  General: Somnolent    Airway:  Pre-Existing Airway: Oral Endotracheal tube    TTE 05/24/2024    Summary         Left Ventricle: The left ventricle is normal in size. Normal wall thickness. There is normal systolic function with a visually estimated ejection fraction of 65%. Grade I diastolic dysfunction.    Right Ventricle: Systolic function is normal. TAPSE is 2.43 cm.    Left Atrium: Agitated saline study of the atrial septum is negative, suggesting absence of intracardiac shunt at the atrial level.    Aortic Valve: There is mild aortic valve sclerosis.    Mitral Valve: The mitral valve is structurally normal.    Tricuspid Valve: The tricuspid valve is structurally normal.    Pulmonic Valve: The pulmonic valve is structurally normal.    IVC/SVC: Patient is ventilated, cannot use IVC diameter to estimate right atrial pressure.    Pericardium: There is no pericardial effusion.      Latest Reference Range & Units 05/25/24 00:34 05/25/24 06:20   WBC 4.50 - 11.50 x10(3)/mcL 14.43 (H)    Hemoglobin 14.0 - 18.0 g/dL 13.1 (L)    Hematocrit 42.0 - 52.0 % 38.8 (L)    Platelet Count 130 - 400 x10(3)/mcL 236    Sodium 136 - 145 mmol/L 139  139 141   Potassium 3.5 - 5.1 mmol/L 4.0 4.0   Chloride 98 - 107 mmol/L 109 (H) 111 (H)   CO2 23 - 31 mmol/L 20 (L) 21 (L)   Anion Gap mEq/L 10.0 9.0   BUN 8.4 - 25.7 mg/dL 11.4 10.2   Creatinine 0.73 - 1.18 mg/dL 0.76  0.74   BUN/CREAT RATIO  15 14   eGFR mL/min/1.73/m2 >60 >60   Glucose 82 - 115 mg/dL 141 (H) 146 (H)   (H): Data is abnormally high  (L): Data is abnormally low    Anesthesia Plan  Type of Anesthesia, risks & benefits discussed:    Anesthesia Type: Gen ETT  Intra-op Monitoring Plan: Standard ASA Monitors  Post Op Pain Control Plan: IV/PO Opioids PRN  Induction:  IV  Airway Plan: Direct  Informed Consent: Informed consent signed with the Patient representative and all parties understand the risks and agree with anesthesia plan.  All questions answered.   ASA Score: 4 Emergent  Day of Surgery Review of History & Physical: H&P Update referred to the surgeon/provider.  Anesthesia Plan Notes: Premedication with Midazolam if sedation from ICU is not adequate  Technique General IV vs Inhalation agent via existing ETT  Plan to continue mechanical ventilation settings from ICU   ICU postop on continued sedation as per previous ICU protocol      Ready For Surgery From Anesthesia Perspective.     .

## 2024-05-25 NOTE — PROGRESS NOTES
Inpatient Nutrition Assessment    Admit Date: 5/24/2024   Total duration of encounter: 1 day   Patient Age: 63 y.o.    Nutrition Recommendation/Prescription     Start tube feeding when appropriate and able to place enteral access.  Tube feeding recommendation:     Peptamen Intense VHP goal rate 60 ml/hr to provide  1200 kcal/d (65% est needs, 102% with meds)  111 g protein/d (87% est needs)  1008 ml free water/d   (calculations based on estimated 20 hr/d run time)     Consider GI consult for tube placement if unable to place NG/OG and unable to extubate vs. Advance diet post extubation.     Communication of Recommendations: reviewed with nurse    Nutrition Assessment     Malnutrition Assessment/Nutrition-Focused Physical Exam    Malnutrition Context: acute illness or injury (05/25/24 0948)  Malnutrition Level:  (does not meet criteria) (05/25/24 0948)  Energy Intake (Malnutrition):  (unable to eval) (05/25/24 0948)  Weight Loss (Malnutrition):  (unable to eval) (05/25/24 0948)  Subcutaneous Fat (Malnutrition):  (does not meet criteria) (05/25/24 0948)           Muscle Mass (Malnutrition):  (does not meet criteria) (05/25/24 0948)                          Fluid Accumulation (Malnutrition):  (does not meet criteria) (05/25/24 0948)        A minimum of two characteristics is recommended for diagnosis of either severe or non-severe malnutrition.    Chart Review    Reason Seen: continuous nutrition monitoring    Malnutrition Screening Tool Results   Have you recently lost weight without trying?: No  Have you been eating poorly because of a decreased appetite?: No   MST Score: 0   Diagnosis:  CVA with left MCA occlusion s/p emergent mechanical thrombectomy and carotid angioplasty   HLD, HTN    Relevant Medical History: unknown    Scheduled Medications:  aspirin, 300 mg, Daily  atorvastatin, 80 mg, Daily  famotidine (PF), 20 mg, Daily  sodium chloride 0.9%, 10 mL, Q6H    Continuous Infusions:  clevidipine, Last Rate: 7  "mg/hr (05/25/24 0750)  propofoL, Last Rate: Stopped (05/24/24 1112)  sodium chloride 3% HYPERTONIC, Last Rate: 60 mL/hr (05/25/24 0614)    PRN Medications:  acetaminophen, 650 mg, Q4H PRN  bisacodyL, 10 mg, Daily PRN  hydrALAZINE, 10 mg, Q6H PRN  ondansetron, 8 mg, Q8H PRN  sodium chloride 0.9%, 10 mL, PRN  sodium chloride 0.9%, 10 mL, PRN    Calorie Containing IV Medications: Cleviprex @ 14 ml/hr (provides 670 kcal/d)    Recent Labs   Lab 05/23/24  2335 05/24/24  0525 05/25/24  0034 05/25/24  0620    136 139  139 141   K 4.4 4.6 4.0 4.0   CALCIUM 9.8 8.4* 8.6* 8.6*   CHLORIDE 108* 109* 109* 111*   CO2 21* 20* 20* 21*   BUN 20.0 19.5 11.4 10.2   CREATININE 1.10 0.85 0.76 0.74   EGFRNORACEVR >60 >60 >60 >60   GLUCOSE 107 103 141* 146*   BILITOT 0.2 0.2 0.3  --    ALKPHOS 48 44 44  --    ALT 24 16 15  --    AST 19 15 15  --    ALBUMIN 4.1 3.5 3.6  --    TRIG 393*  --   --   --    HGBA1C  --  5.6  --   --    WBC 8.19 10.50 14.43*  --    HGB 14.1 13.1* 13.1*  --    HCT 42.1 39.2* 38.8*  --      Nutrition Orders:  Diet NPO      Appetite/Oral Intake: not applicable/not applicable  Factors Affecting Nutritional Intake: on mechanical ventilation  Social Needs Impacting Access to Food: unable to assess at this time; will attempt on follow-up  Food/Presybeterian/Cultural Preferences: unable to obtain  Food Allergies: none reported  Last Bowel Movement: 05/24/24  Wound(s):  none noted    Comments    5/25/24: Discussed with RN. Will provide tube feeding recommendations for when appropriate to start tube feeding. Receiving kcal from meds.  Currently unable to place NG per RN. May need to consider GI consult for tube placement.     Anthropometrics    Height: 5' 10" (177.8 cm),    Last Weight: 84.9 kg (187 lb 2.7 oz) (05/24/24 0435), Weight Method: Bed Scale  BMI (Calculated): 26.9  BMI Classification: overweight (BMI 25-29.9)        Ideal Body Weight (IBW), Male: 166 lb     % Ideal Body Weight, Male (lb): 112.75 %            "               Usual Weight Provided By: unable to obtain usual weight    Wt Readings from Last 5 Encounters:   05/24/24 84.9 kg (187 lb 2.7 oz)   05/23/24 86.6 kg (191 lb)     Weight Change(s) Since Admission:   Wt Readings from Last 1 Encounters:   05/24/24 0435 84.9 kg (187 lb 2.7 oz)   Admit Weight: 84.9 kg (187 lb 2.7 oz) (05/24/24 0435), Weight Method: Bed Scale    Estimated Needs    Weight Used For Calorie Calculations: 84.9 kg (187 lb 2.7 oz)  Energy Calorie Requirements (kcal): 1838kcal  Energy Need Method: [facility]  Weight Used For Protein Calculations: 84.9 kg (187 lb 2.7 oz)  Protein Requirements: 128gm (1.5g/kg)  Fluid Requirements (mL): 1838ml (1ml/kcal) or per MD, notedon 3%NS)  CHO Requirement: 207gm     Enteral Nutrition     Patient not receiving enteral nutrition at this time.    Parenteral Nutrition     Patient not receiving parenteral nutrition support at this time.    Evaluation of Received Nutrient Intake    Calories: not meeting estimated needs  Protein: not meeting estimated needs    Patient Education     Not applicable.    Nutrition Diagnosis     PES: Inadequate oral intake related to acute illness as evidenced by intubation since admit. (new)     Nutrition Interventions     Intervention(s): modified composition of enteral nutrition, modified rate of enteral nutrition, and collaboration with other providers    Goal: Meet greater than 80% of nutritional needs by follow-up. (new)  Goal: Tolerate enteral feeding at goal rate by follow-up. (new)    Nutrition Goals & Monitoring     Dietitian will monitor: energy intake and enteral nutrition intake  Discharge planning: too early to determine; pending clinical course  Nutrition Risk/Follow-Up: high (follow-up in 1-4 days)   Please consult if re-assessment needed sooner.

## 2024-05-25 NOTE — PROGRESS NOTES
Brief update note    Off sedation, while on EEG, patient was moving LUE spontaneously with posturing in the right arm. Moving both toes spontaneously but not following commands or opening eyes.     Can be put back on sedation once EEG is completed as the shaking/rigidity is now captured on the EEG. Will await final EEG read.     Gerald Quesada MD  Vascular and Interventional Neurology

## 2024-05-25 NOTE — ANESTHESIA POSTPROCEDURE EVALUATION
Anesthesia Post Evaluation    Patient: Robert Degroot    Procedure(s) Performed: Procedure(s) (LRB):  CRANIECTOMY (N/A)    Final Anesthesia Type: general      Patient location during evaluation: ICU  Patient participation: No - Unable to Participate, Intubation  Level of consciousness: sedated  Post-procedure vital signs: reviewed and stable  Pain management: adequate      Anesthetic complications: no      Cardiovascular status: hemodynamically stable  Respiratory status: intubated and ventilator  Hydration status: euvolemic  Follow-up not needed.        Cleviprex drip continued      Vitals Value Taken Time   /81 05/25/24 1346   Temp 37.3 °C (99.1 °F) 05/25/24 1200   Pulse 128 05/25/24 1347   Resp 22 05/25/24 1347   SpO2 97 % 05/25/24 1347   Vitals shown include unfiled device data.      No case tracking events are documented in the log.      Pain/Willem Score: Pain Rating Post Med Admin: 0 (5/24/2024  4:15 PM)

## 2024-05-25 NOTE — PROGRESS NOTES
Patient intubated and requiring mechanical ventilation for respiratory support at this time. ST evaluation not appropriate. Will sign off.

## 2024-05-25 NOTE — BRIEF OP NOTE
[facility] - 7th Floor ICU  Brief Operative Note    SUMMARY     Surgery Date: 5/24/2024     Surgeons and Role:    Rico Adam MD    Assisting Surgeon: Robel Wagoner PA-C    Pre-op Diagnosis:  CVA with cerebral edema    Post-op Diagnosis:  Same    Left craniectomy for CVA with cerebral edema.     Anesthesia: * No anesthesia type entered *    Implants:  Implant Name Type Inv. Item Serial No.  Lot No. LRB No. Used Action   DEVICE CLOSURE ANGIOSEAL 8FR - [id number] Closure Device Angio DEVICE CLOSURE ANGIOSEAL 8FR  [employer] [id number]  1 Implanted       Operative Findings: Dictated     Estimated Blood Loss: 5 mL    Estimated Blood Loss has been documented.         Specimens:   Specimen (24h ago, onward)      None

## 2024-05-25 NOTE — PT/OT/SLP PROGRESS
Physical Therapy    Missed Treatment Session     Patient Name:  Robert Degroot   MRN:  [id number]      Patient not seen at this time secondary to pt remains intubated, off sedation but not following commands. Nurse reports possible attempt to extubate this afternoon.    Will follow-up as patient is appropriate/available/agreeable to participate and as therapists' schedule allows.

## 2024-05-25 NOTE — PT/OT/SLP PROGRESS
Pt remains intubated, possible extubation today.  Note entered later indicates he will remain intubated until tomorrow due to concern for malignant cerebral edema.  /89.  OT to f/u.

## 2024-05-25 NOTE — ANESTHESIA PROCEDURE NOTES
Arterial    Diagnosis: hemodynamic monitoring    Patient location during procedure: done out of OR  Timeout: 5/25/2024 2:09 PM  Procedure end time: 5/25/2024 2:12 PM    Staffing  Authorizing Provider: Madeline Rodas MD  Performing Provider: Madeline Rodas MD    Staffing  Performed by: Madeline Rodas MD  Authorized by: Madeline Rodas MD    Anesthesiologist was present at the time of the procedure.    Preanesthetic Checklist  Completed: patient identified, IV checked, risks and benefits discussed, surgical consent, monitors and equipment checked, pre-op evaluation, timeout performed and anesthesia consent givenArterial  Skin Prep: alcohol swabs  Local Infiltration: none  Orientation: right  Location: radial    Catheter Size: 20 G  Catheter placement by Anatomical landmarks. Heme positive aspiration all ports. Insertion Attempts: 1  Assessment  Dressing: secured with tape and tegaderm  Patient: Tolerated well

## 2024-05-25 NOTE — PROGRESS NOTES
[facility] - ICU  Pulmonary Critical Care Note    Patient Name: Robert Degroot  MRN: [id number]  Admission Date: 5/24/2024  Hospital Length of Stay: 1 days  Code Status: Full Code  Attending Provider: MILADY Rai MD  Primary Care Provider: Maria Del Rosario, Primary Doctor     Subjective:     HPI:   Mr. Degroot is a 62 y/o male with unknown past medical history who presented to [facility] as a transfer from [place] for mechanical thrombectomy secondary to acute CVA.  Per chart review it appears patient attempted to take a nap earlier in the day after which he woke up experiencing hemineglect, aphasia, and right-sided agnosia with right-sided flaccid paralysis and facial droop. CTA head and neck was performed upon arrival to ED showing a non-opacification of the proximal to distal M1 segment of the left MCA suggesting occlusion or severe stenosis.  Last known normal was approx 1700 and patient was not in window for TPA. He arrives to the ICU s/p successful mechanical thrombectomy and carotid angioplasty.  Further history is limited secondary to intubation and sedation.     Hospital Course/Significant events:  5/24/2024: Patient admitted to the ICU s/p mechanical thrombectomy and carotid angioplasty.      24 Hour Interval History:  Patient has been weaned to CPAP with pressure support.  He has a right-sided hemiparesis.  Case was discussed with Interventional Neurology earlier.  Remains on Cleviprex to achieve target blood pressures.  On hypertonic saline as well.      Current Inpatient Medications   aspirin  300 mg Rectal Daily    atorvastatin  80 mg Oral Daily    famotidine (PF)  20 mg Intravenous Daily    sodium chloride 0.9%  10 mL Intravenous Q6H       Current Intravenous Infusions   clevidipine  0-32 mg/hr Intravenous Continuous 14 mL/hr at 05/25/24 0750 7 mg/hr at 05/25/24 0750    propofoL  0-50 mcg/kg/min Intravenous Continuous   Stopped at 05/24/24 1112    sodium chloride 3% HYPERTONIC  60 mL/hr Intravenous  Continuous 60 mL/hr at 05/25/24 0614 Rate Verify at 05/25/24 0614         Review of Systems   Reason unable to perform ROS: Intubation and sedation.          Objective:       Intake/Output Summary (Last 24 hours) at 5/25/2024 0936  Last data filed at 5/25/2024 0800  Gross per 24 hour   Intake 2493.86 ml   Output 3300 ml   Net -806.14 ml         Vital Signs (Most Recent):  Temp: 98.2 °F (36.8 °C) (05/25/24 0800)  Pulse: 74 (05/25/24 0842)  Resp: 16 (05/25/24 0842)  BP: (!) 183/89 (05/25/24 0831)  SpO2: 98 % (05/25/24 0842)  Body mass index is 26.86 kg/m².  Weight: 84.9 kg (187 lb 2.7 oz) Vital Signs (24h Range):  Temp:  [98.2 °F (36.8 °C)-99 °F (37.2 °C)] 98.2 °F (36.8 °C)  Pulse:  [] 74  Resp:  [11-33] 16  SpO2:  [96 %-100 %] 98 %  BP: (101-197)/() 183/89     Physical Exam  Constitutional:       General: He is not in acute distress.     Appearance: He is normal weight. He is not diaphoretic.      Comments: Intubated and mechanically ventilated, resting comfortably   HENT:      Head: Normocephalic and atraumatic.   Eyes:      Pupils: Pupils are equal, round, and reactive to light.   Cardiovascular:      Rate and Rhythm: Normal rate and regular rhythm.      Pulses: Normal pulses.      Heart sounds: Normal heart sounds. No murmur heard.  Pulmonary:      Breath sounds: No wheezing, rhonchi or rales.      Comments: ET tube in place on mechanical ventilation   Abdominal:      General: Bowel sounds are normal. There is no distension.      Palpations: Abdomen is soft.   Musculoskeletal:         General: No swelling.   Skin:     General: Skin is warm and dry.      Findings: No bruising or lesion.   Neurological:      Comments: Patient with right-sided hemiparesis, does withdraw to pain on the right however             Lines/Drains/Airways       Peripherally Inserted Central Catheter Line  Duration             PICC Triple Lumen 05/24/24 1844 right brachial <1 day              Drain  Duration             Male  External Urinary Catheter 05/24/24 0400 Medium 1 day              Airway  Duration                  Airway - Non-Surgical 05/24/24 0239 1 day              Peripheral Intravenous Line  Duration                  Peripheral IV - Single Lumen 18 G Left Antecubital -- days         Peripheral IV - Single Lumen 18 G Posterior;Right Hand -- days         Peripheral IV - Single Lumen 05/24/24 1139 Right;Posterior Forearm <1 day                    Significant Labs:    Lab Results   Component Value Date    WBC 14.43 (H) 05/25/2024    HGB 13.1 (L) 05/25/2024    HCT 38.8 (L) 05/25/2024    MCV 91.7 05/25/2024     05/25/2024         BMP  Lab Results   Component Value Date     05/25/2024    K 4.0 05/25/2024    CHLORIDE 111 (H) 05/25/2024    CO2 21 (L) 05/25/2024    BUN 10.2 05/25/2024    CREATININE 0.74 05/25/2024    CALCIUM 8.6 (L) 05/25/2024    AGAP 9.0 05/25/2024    EGFRNONAA >60 08/30/2021       ABG  Recent Labs   Lab 05/24/24  0005   PH 7.374   PO2 77*   PCO2 39.0   HCO3 22.8*   BE -2       Mechanical Ventilation Support:  Vent Mode: CPAP / PSV (05/25/24 0520)  Set Rate: 12 BPM (05/25/24 0155)  Vt Set: 500 mL (05/25/24 0155)  Pressure Support: 10 cmH20 (05/25/24 0520)  PEEP/CPAP: 5 cmH20 (05/25/24 0520)  Oxygen Concentration (%): 35 (05/25/24 0800)  Peak Airway Pressure: 16 cmH20 (05/25/24 0520)  Total Ve: 8.2 L/m (05/25/24 0520)  F/VT Ratio<105 (RSBI): (!) 26.47 (05/25/24 0520)    Significant Imaging:  I have reviewed the pertinent imaging within the past 24 hours.        Assessment/Plan:     Assessment  CVA with left MCA occlusion s/p emergent mechanical thrombectomy and carotid angioplasty   HLD, HTN  Acute respiratory failure secondary to neurologic dysfunction    Plan  Continuing blood pressure management and hypertonic saline for possible cerebral edema post infarct.  Case was discussed with Interventional Neurology.  We will keep the patient intubated until tomorrow to maintain a secure airway during the  time period most at risk for brain swelling.      31 minutes of critical care was time spent personally by me on the following activities: development of treatment plan with patient or surrogate and bedside caregivers, discussions with consultants, evaluation of patient's response to treatment, examination of patient, ordering and performing treatments and interventions, ordering and review of laboratory studies, ordering and review of radiographic studies, pulse oximetry, re-evaluation of patient's condition.  This critical care time did not overlap with that of any other provider or involve time for any procedures.     Ricky Rodas MD  Pulmonary Critical Care Medicine  [facility]

## 2024-05-25 NOTE — PLAN OF CARE
Problem: Adult Inpatient Plan of Care  Goal: Patient-Specific Goal (Individualized)  Outcome: Progressing     Problem: Skin Injury Risk Increased  Goal: Skin Health and Integrity  Outcome: Progressing     Problem: Stroke, Ischemic (Includes Transient Ischemic Attack)  Goal: Optimal Coping  Outcome: Unable to Meet  Goal: Effective Bowel Elimination  Outcome: Unable to Meet  Goal: Optimal Cerebral Tissue Perfusion  Outcome: Unable to Meet  Goal: Optimal Cognitive Function  Outcome: Unable to Meet  Goal: Improved Communication Skills  Outcome: Unable to Meet  Goal: Optimal Functional Ability  Outcome: Unable to Meet  Goal: Optimal Nutrition Intake  Outcome: Unable to Meet  Goal: Effective Oxygenation and Ventilation  Outcome: Progressing  Goal: Safe and Effective Swallow  Outcome: Unable to Meet  Goal: Effective Urinary Elimination  Outcome: Progressing

## 2024-05-25 NOTE — PROGRESS NOTES
Neurointerventional progress note:    Subjective: no acute events overnight. MRI pending. On 3%. Na 141 today.     Exam:  Vitals:    05/25/24 0842   BP:    Pulse: 74   Resp: 16   Temp:        Intubated and off sedation.   Opens eyes to voice. Left gaze preference. Does not follow commands.   LUE, LLE antigravity  Withdraws to pain on right    Imaging: NA    A/P:   63 M with left MCA occlusion transferred from [facility] for intervention. LKN 1700. Not a candidate for TNK.      Patient underwent mechanical thrombectomy and carotid angioplasty. No stent was deployed due to risk of hemorrhagic transformation.      Repeat CTH with evolving infarct and mild petechial hemorrhage and mild shift.      Plan:  - q1h neurochecks    - remain intubated until tomorrow AM due to concern for malignant cerebral edema.   - continue 3% NaCl at 60 cc/hr  - Na and S. 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      Will continue to follow.     Gerald Quesada MD  Vascular and Interventional Neurology

## 2024-05-26 LAB
ALBUMIN SERPL-MCNC: 2.9 G/DL (ref 3.4–4.8)
ALBUMIN/GLOB SERPL: 0.9 RATIO (ref 1.1–2)
ALLENS TEST BLOOD GAS (OHS): ABNORMAL
ALLENS TEST BLOOD GAS (OHS): ABNORMAL
ALP SERPL-CCNC: 38 UNIT/L (ref 40–150)
ALT SERPL-CCNC: 12 UNIT/L (ref 0–55)
ANION GAP SERPL CALC-SCNC: 8 MEQ/L
AST SERPL-CCNC: 16 UNIT/L (ref 5–34)
BASE EXCESS BLD CALC-SCNC: -0.9 MMOL/L (ref -2–2)
BASE EXCESS BLD CALC-SCNC: 0.1 MMOL/L
BASOPHILS # BLD AUTO: 0.03 X10(3)/MCL
BASOPHILS NFR BLD AUTO: 0.3 %
BILIRUB SERPL-MCNC: 0.3 MG/DL
BLOOD GAS SAMPLE TYPE (OHS): ABNORMAL
BLOOD GAS SAMPLE TYPE (OHS): ABNORMAL
BUN SERPL-MCNC: 10.2 MG/DL (ref 8.4–25.7)
CA-I BLD-SCNC: 1.15 MMOL/L (ref 1.12–1.23)
CA-I BLD-SCNC: 1.21 MMOL/L (ref 1.12–1.23)
CALCIUM SERPL-MCNC: 8.3 MG/DL (ref 8.8–10)
CHLORIDE SERPL-SCNC: 122 MMOL/L (ref 98–107)
CO2 BLDA-SCNC: 23.2 MMOL/L
CO2 BLDA-SCNC: 24.6 MMOL/L
CO2 SERPL-SCNC: 22 MMOL/L (ref 23–31)
COHGB MFR BLDA: 0.8 % (ref 0.5–1.5)
CREAT SERPL-MCNC: 0.87 MG/DL (ref 0.73–1.18)
CREAT/UREA NIT SERPL: 12
DRAWN BY BLOOD GAS (OHS): ABNORMAL
DRAWN BY BLOOD GAS (OHS): ABNORMAL
EOSINOPHIL # BLD AUTO: 0 X10(3)/MCL (ref 0–0.9)
EOSINOPHIL NFR BLD AUTO: 0 %
ERYTHROCYTE [DISTWIDTH] IN BLOOD BY AUTOMATED COUNT: 14.4 % (ref 11.5–17)
GFR SERPLBLD CREATININE-BSD FMLA CKD-EPI: >60 ML/MIN/1.73/M2
GLOBULIN SER-MCNC: 3.2 GM/DL (ref 2.4–3.5)
GLUCOSE SERPL-MCNC: 159 MG/DL (ref 82–115)
HCO3 BLDA-SCNC: 22.2 MMOL/L (ref 22–26)
HCO3 BLDA-SCNC: 23.6 MMOL/L (ref 22–26)
HCT VFR BLD AUTO: 33.3 % (ref 42–52)
HGB BLD-MCNC: 11 G/DL (ref 14–18)
IMM GRANULOCYTES # BLD AUTO: 0.04 X10(3)/MCL (ref 0–0.04)
IMM GRANULOCYTES NFR BLD AUTO: 0.3 %
LPM (OHS): 15
LPM (OHS): 6
LYMPHOCYTES # BLD AUTO: 0.58 X10(3)/MCL (ref 0.6–4.6)
LYMPHOCYTES NFR BLD AUTO: 4.9 %
MAGNESIUM SERPL-MCNC: 2.2 MG/DL (ref 1.6–2.6)
MCH RBC QN AUTO: 31 PG (ref 27–31)
MCHC RBC AUTO-ENTMCNC: 33 G/DL (ref 33–36)
MCV RBC AUTO: 93.8 FL (ref 80–94)
METHGB MFR BLDA: 0.7 % (ref 0.4–1.5)
MONOCYTES # BLD AUTO: 1.04 X10(3)/MCL (ref 0.1–1.3)
MONOCYTES NFR BLD AUTO: 8.9 %
NEUTROPHILS # BLD AUTO: 10.06 X10(3)/MCL (ref 2.1–9.2)
NEUTROPHILS NFR BLD AUTO: 85.6 %
NRBC BLD AUTO-RTO: 0 %
O2 HB BLOOD GAS (OHS): 92 % (ref 94–97)
OSMOLALITY SERPL: 308 MOSM/KG (ref 280–300)
OSMOLALITY SERPL: 317 MOSM/KG (ref 280–300)
OSMOLALITY SERPL: 320 MOSM/KG (ref 280–300)
OSMOLALITY SERPL: 324 MOSM/KG (ref 280–300)
OXYGEN DEVICE BLOOD GAS (OHS): ABNORMAL
OXYGEN DEVICE BLOOD GAS (OHS): ABNORMAL
OXYHGB MFR BLDA: 15.4 G/DL (ref 12–16)
PCO2 BLDA: 32 MMHG (ref 35–45)
PCO2 BLDA: 34 MMHG (ref 35–45)
PH BLDA: 7.45 [PH] (ref 7.35–7.45)
PH BLDA: 7.45 [PH] (ref 7.35–7.45)
PHOSPHATE SERPL-MCNC: 2 MG/DL (ref 2.3–4.7)
PLATELET # BLD AUTO: 178 X10(3)/MCL (ref 130–400)
PMV BLD AUTO: 10.4 FL (ref 7.4–10.4)
PO2 BLDA: 104 MMHG (ref 80–100)
PO2 BLDA: 63 MMHG (ref 80–100)
POCT GLUCOSE: 145 MG/DL (ref 70–110)
POTASSIUM BLOOD GAS (OHS): 3.2 MMOL/L (ref 3.5–5)
POTASSIUM BLOOD GAS (OHS): 3.4 MMOL/L (ref 3.5–5)
POTASSIUM SERPL-SCNC: 3.3 MMOL/L (ref 3.5–5.1)
PROT SERPL-MCNC: 6.1 GM/DL (ref 5.8–7.6)
RBC # BLD AUTO: 3.55 X10(6)/MCL (ref 4.7–6.1)
SAMPLE SITE BLOOD GAS (OHS): ABNORMAL
SAMPLE SITE BLOOD GAS (OHS): ABNORMAL
SAO2 % BLDA: 92.8 %
SAO2 % BLDA: 98 %
SODIUM BLOOD GAS (OHS): 146 MMOL/L (ref 137–145)
SODIUM BLOOD GAS (OHS): 152 MMOL/L (ref 137–145)
SODIUM SERPL-SCNC: 150 MMOL/L (ref 136–145)
SODIUM SERPL-SCNC: 151 MMOL/L (ref 136–145)
SODIUM SERPL-SCNC: 152 MMOL/L (ref 136–145)
SODIUM SERPL-SCNC: 156 MMOL/L (ref 136–145)
WBC # SPEC AUTO: 11.75 X10(3)/MCL (ref 4.5–11.5)

## 2024-05-26 PROCEDURE — 84295 ASSAY OF SERUM SODIUM: CPT | Performed by: PSYCHIATRY & NEUROLOGY

## 2024-05-26 PROCEDURE — 83930 ASSAY OF BLOOD OSMOLALITY: CPT | Performed by: PSYCHIATRY & NEUROLOGY

## 2024-05-26 PROCEDURE — 20000000 HC ICU ROOM

## 2024-05-26 PROCEDURE — 84100 ASSAY OF PHOSPHORUS: CPT

## 2024-05-26 PROCEDURE — A4216 STERILE WATER/SALINE, 10 ML: HCPCS | Performed by: PSYCHIATRY & NEUROLOGY

## 2024-05-26 PROCEDURE — 27000221 HC OXYGEN, UP TO 24 HOURS

## 2024-05-26 PROCEDURE — 63600175 PHARM REV CODE 636 W HCPCS

## 2024-05-26 PROCEDURE — 93005 ELECTROCARDIOGRAM TRACING: CPT

## 2024-05-26 PROCEDURE — 99233 SBSQ HOSP IP/OBS HIGH 50: CPT | Mod: FS,,, | Performed by: PSYCHIATRY & NEUROLOGY

## 2024-05-26 PROCEDURE — 99900035 HC TECH TIME PER 15 MIN (STAT)

## 2024-05-26 PROCEDURE — 25000003 PHARM REV CODE 250: Performed by: PSYCHIATRY & NEUROLOGY

## 2024-05-26 PROCEDURE — 83735 ASSAY OF MAGNESIUM: CPT

## 2024-05-26 PROCEDURE — 80053 COMPREHEN METABOLIC PANEL: CPT

## 2024-05-26 PROCEDURE — 25000003 PHARM REV CODE 250: Performed by: NURSE PRACTITIONER

## 2024-05-26 PROCEDURE — C9248 INJ, CLEVIDIPINE BUTYRATE: HCPCS | Performed by: INTERNAL MEDICINE

## 2024-05-26 PROCEDURE — 25000003 PHARM REV CODE 250

## 2024-05-26 PROCEDURE — 36415 COLL VENOUS BLD VENIPUNCTURE: CPT | Performed by: PSYCHIATRY & NEUROLOGY

## 2024-05-26 PROCEDURE — 97167 OT EVAL HIGH COMPLEX 60 MIN: CPT

## 2024-05-26 PROCEDURE — 85025 COMPLETE CBC W/AUTO DIFF WBC: CPT

## 2024-05-26 PROCEDURE — 99900031 HC PATIENT EDUCATION (STAT)

## 2024-05-26 PROCEDURE — 36415 COLL VENOUS BLD VENIPUNCTURE: CPT

## 2024-05-26 PROCEDURE — 82803 BLOOD GASES ANY COMBINATION: CPT

## 2024-05-26 PROCEDURE — 94760 N-INVAS EAR/PLS OXIMETRY 1: CPT | Mod: XB

## 2024-05-26 PROCEDURE — 63600175 PHARM REV CODE 636 W HCPCS: Performed by: INTERNAL MEDICINE

## 2024-05-26 PROCEDURE — 63600175 PHARM REV CODE 636 W HCPCS: Performed by: PSYCHIATRY & NEUROLOGY

## 2024-05-26 PROCEDURE — 37799 UNLISTED PX VASCULAR SURGERY: CPT

## 2024-05-26 RX ORDER — LIDOCAINE HYDROCHLORIDE 20 MG/ML
SOLUTION OROPHARYNGEAL
Status: DISCONTINUED
Start: 2024-05-26 | End: 2024-05-26 | Stop reason: WASHOUT

## 2024-05-26 RX ORDER — POTASSIUM CHLORIDE 14.9 MG/ML
20 INJECTION INTRAVENOUS
Status: DISPENSED | OUTPATIENT
Start: 2024-05-26 | End: 2024-05-26

## 2024-05-26 RX ADMIN — SODIUM CHLORIDE 60 ML/HR: 3 INJECTION, SOLUTION INTRAVENOUS at 12:05

## 2024-05-26 RX ADMIN — CEFAZOLIN SODIUM 2 G: 2 SOLUTION INTRAVENOUS at 08:05

## 2024-05-26 RX ADMIN — LABETALOL HYDROCHLORIDE 0.3 MG/MIN: 5 INJECTION, SOLUTION INTRAVENOUS at 09:05

## 2024-05-26 RX ADMIN — LEVETIRACETAM 500 MG: 100 INJECTION, SOLUTION INTRAVENOUS at 08:05

## 2024-05-26 RX ADMIN — POTASSIUM CHLORIDE 20 MEQ: 14.9 INJECTION, SOLUTION INTRAVENOUS at 11:05

## 2024-05-26 RX ADMIN — SODIUM CHLORIDE 60 ML/HR: 3 INJECTION, SOLUTION INTRAVENOUS at 07:05

## 2024-05-26 RX ADMIN — FAMOTIDINE 20 MG: 10 INJECTION, SOLUTION INTRAVENOUS at 08:05

## 2024-05-26 RX ADMIN — CLEVIPIDINE 14 MG/HR: 0.5 EMULSION INTRAVENOUS at 05:05

## 2024-05-26 RX ADMIN — CLEVIPIDINE 16 MG/HR: 0.5 EMULSION INTRAVENOUS at 04:05

## 2024-05-26 RX ADMIN — SODIUM CHLORIDE 60 ML/HR: 3 INJECTION, SOLUTION INTRAVENOUS at 09:05

## 2024-05-26 RX ADMIN — MUPIROCIN: 20 OINTMENT TOPICAL at 08:05

## 2024-05-26 RX ADMIN — POTASSIUM CHLORIDE 20 MEQ: 14.9 INJECTION, SOLUTION INTRAVENOUS at 08:05

## 2024-05-26 RX ADMIN — SODIUM CHLORIDE, PRESERVATIVE FREE 10 ML: 5 INJECTION INTRAVENOUS at 12:05

## 2024-05-26 RX ADMIN — CLEVIPIDINE 8 MG/HR: 0.5 EMULSION INTRAVENOUS at 10:05

## 2024-05-26 RX ADMIN — SODIUM CHLORIDE, PRESERVATIVE FREE 10 ML: 5 INJECTION INTRAVENOUS at 05:05

## 2024-05-26 RX ADMIN — CEFAZOLIN SODIUM 2 G: 2 SOLUTION INTRAVENOUS at 04:05

## 2024-05-26 RX ADMIN — CLEVIPIDINE 8 MG/HR: 0.5 EMULSION INTRAVENOUS at 11:05

## 2024-05-26 RX ADMIN — SODIUM CHLORIDE, PRESERVATIVE FREE 10 ML: 5 INJECTION INTRAVENOUS at 06:05

## 2024-05-26 RX ADMIN — CEFAZOLIN SODIUM 2 G: 2 SOLUTION INTRAVENOUS at 12:05

## 2024-05-26 RX ADMIN — ASPIRIN 300 MG: 300 SUPPOSITORY RECTAL at 08:05

## 2024-05-26 NOTE — PT/OT/SLP EVAL
Occupational Therapy  Evaluation    Name: Robert Degroot  MRN: [id number]  Admitting Diagnosis: CVA  Recent Surgery: * No procedures listed * 2 Days Post-Op    Recommendations:     Discharge therapy intensity: Moderate Intensity Therapy   Discharge Equipment Recommendations:  to be determined by next level of care  Barriers to discharge:   (medical acuity)    Assessment:     Robert Degroot is a 63 y.o. male with a medical diagnosis of left MCA occlusion resulting in hemineglect, aphasia, and right-sided agnosia with right-sided flaccid paralysis and facial droop s/p mechanical thrombectomy and carotid angioplasty, cerebral edema s/p decompressive craniectomy, Acute respiratory failure secondary to neurologic dysfunction-status post self-extubation.  New OT consult was received from neurosurgery post operatively. RN cleared pt for eval this AM. He presents with the following performance deficits affecting function: weakness, impaired endurance, impaired self care skills, impaired functional mobility, impaired balance, decreased safety awareness, decreased lower extremity function, decreased upper extremity function, impaired cognition, visual deficits, abnormal tone. He is awake but not visually attentive or able to follow commands to actively participate in any functional tasks.    Rehab Prognosis: Poor; patient would benefit from acute skilled OT services to address these deficits and reach maximum level of function.       Plan:     Patient to be seen 3 x/week to address the above listed problems via self-care/home management, therapeutic activities, therapeutic exercises, neuromuscular re-education, cognitive retraining  Plan of Care Expires: 06/28/24  Plan of Care Reviewed with: patient    Subjective     Chief Complaint: n/a  Patient/Family Comments/goals: n/a    Occupational Profile:  Pt unable to provide any info. Per nurse he lives with family and was ambulatory at baseline.    Pain/Comfort:  Pain Rating 1:  0/10    Patients cultural, spiritual, Muslim conflicts given the current situation:      Objective:     OT communicated with nurse prior to session.      Patient was found right sidelying with arterial line, blood pressure cuff, de jesus catheter, ROSELIA drain, oxygen, peripheral IV, pulse ox (continuous), telemetry, SCD upon OT entry to room.    General Precautions: Standard, aphasia, aspiration, NPO (-160, helmet to mobilize)  Orthopedic Precautions: N/A  Braces: N/A    Vital Signs: Blood Pressure: 137/58  HR: 82  Sp02: 98%  Supplemental 02: 15 L on NRB  With Activity: vitals stable    Bed Mobility:    Patient completed Rolling/Turning to Left with  total assistance  Patient completed Rolling/Turning to Right with total assistance  Patient completed Supine to Sit with total assistance  Patient completed Sit to Supine with total assistance    Functional Mobility/Transfers:  Unable to perform + not assessed due to awaiting helmet     Activities of Daily Living:  Feeding:  does not occur; NPO  Grooming: total assistance    Bathing: total assistance    Upper Body Dressing: total assistance    Lower Body Dressing: total assistance    Toileting: total assistance      [facility] 6 Click ADL:  AMPA Total Score: 6    Functional Cognition:  Awake (eyes open), +visual startle. Occasional spontaneous movement on L side (purposeful x1, adjusting gown). 0% command following.    Visual Perceptual Skills:  L gaze deviation with 0% visual attention to stimuli    Upper Extremity Function:  Right Upper Extremity:   No AROM noted, PROM intact but mildly increased flexor tone observed    Left Upper Extremity:  Unable to follow commands for assessment, spontaneous movement noted     Balance:   Total A for unsupported sitting with R lean, able to sit with mod A using LUE on bedrail but frequently lets go. Occasional pushing to R side.    Therapeutic Positioning  Risk for acquired pressure injuries is significantly increased due to  impaired mobility, inability to communicate toileting needs, and decreased level of consciousness .    OT interventions performed during the course of today's session:   Therapeutic positioning was provided at the conclusion of session to offload all bony prominences for the prevention and/or reduction of pressure injuries    Skin assessment: sacrum intact    OT recommendations for therapeutic positioning throughout hospitalization:   Follow [facility] Pressure Injury Prevention Protocol    Patient Education:  Patient provided with verbal education education regarding OT role/goals/POC.   No evidence of learning.      Patient left right sidelying with all lines intact and nurse present.    GOALS:   Multidisciplinary Problems       Occupational Therapy Goals          Problem: Occupational Therapy    Goal Priority Disciplines Outcome Interventions   Occupational Therapy Goal     OT, PT/OT Progressing    Description: LTG: Pt will perform basic ADLs from w/c level with mod A using LRAD by d/c.    STG: to be met by 6/21  1. Pt will follow >50% of simple one step commands  2. Pt will perform grooming EOB with mod A.   3. Pt will perform functional grasp/reach/release of items >50% of trials in prep for increased participation in ADL tasks.   4. Pt will perform sit<>stand with mod A x 2 in prep for ADL t/fs.                        History:     No past medical history on file.    No past surgical history on file.    Time Tracking:     OT Date of Treatment: 05/26/24  OT Start Time: 0900  OT Stop Time: 0920  OT Total Time (min): 20 min    Billable Minutes:Evaluation high    5/26/2024

## 2024-05-26 NOTE — NURSING
Nurses Note -- 4 Eyes      5/26/2024   7:04 AM      Skin assessed during: Daily Assessment      [] No Altered Skin Integrity Present    [x]Prevention Measures Documented      [x] Yes- Altered Skin Integrity Present or Discovered   [] LDA Added if Not in Epic (Describe Wound)   [] New Altered Skin Integrity was Present on Admit and Documented in LDA   [] Wound Image Taken    Wound Care Consulted? No    Attending Nurse:  MANFRED Robles    Second RN/Staff Member:   MANFRED Noble

## 2024-05-26 NOTE — PROGRESS NOTES
[facility] - 7th Floor ICU  Neurology  Progress Note    Patient Name: Robert Degroot  MRN: [id number]  Admission Date: 5/24/2024  Hospital Length of Stay: 2 days  Code Status: Full Code   Attending Provider: MILADY Rai MD  Primary Care Physician: No, Primary Doctor   Principal Problem:<principal problem not specified>        Subjective:     Interval History:   No new issues, underwent crani yesterday afternoon, nursing reports since crani he has improved compared to immediately prior to crani.     Current Neurological Medications:     Current Facility-Administered Medications   Medication Dose Route Frequency Provider Last Rate Last Admin    0.9%  NaCl infusion   Intravenous Continuous Day, PANTERA Huston 100 mL/hr at 05/26/24 0703 Rate Verify at 05/26/24 0703    acetaminophen suppository 650 mg  650 mg Rectal Q4H PRN Day, PANTERA Huston        acetaminophen tablet 650 mg  650 mg Oral Q4H PRN Day, PANTERA Huston        aspirin suppository 300 mg  300 mg Rectal Daily LinseyRadha gaxiola W, NP   300 mg at 05/26/24 0817    atorvastatin tablet 80 mg  80 mg Oral Daily Susanna Strauss MD        bisacodyL suppository 10 mg  10 mg Rectal Daily PRN LinseyRadha W, NP        cefazolin (ANCEF) 2 gram in dextrose 5% 50 mL IVPB (premix)  2 g Intravenous Q8H Day, PANTERA Huston   Stopped at 05/26/24 0847    clevidipine (CLEVIPREX) 25 mg/50 mL infusion  0-32 mg/hr Intravenous Continuous Ricky Rodas MD 28 mL/hr at 05/26/24 0703 14 mg/hr at 05/26/24 0703    famotidine (PF) injection 20 mg  20 mg Intravenous Q12H Day, PANTERA Huston   20 mg at 05/26/24 0817    hydrALAZINE injection 10 mg  10 mg Intravenous Q6H PRN Susanna Strauss MD   10 mg at 05/25/24 1655    HYDROcodone-acetaminophen 5-325 mg per tablet 1 tablet  1 tablet Oral Q4H PRN Day, PANTERA Huston        labetaloL (NORMODYNE,TRANDATE) 500 mg in 100 mL infusion (conc: 5 mg/mL)  0-3 mg/min Intravenous Continuous Dinh Pan MD 3.6 mL/hr at  05/26/24 0927 0.3 mg/min at 05/26/24 0927    levETIRAcetam (Keppra) 500 mg in dextrose 5 % in water (D5W) 100 mL IVPB (MB+)  500 mg Intravenous Q12H Gerald Quesada  mL/hr at 05/26/24 0851 500 mg at 05/26/24 0851    morphine injection 2 mg  2 mg Intravenous Q4H PRN Day, PANTERA Huston   2 mg at 05/25/24 1719    mupirocin 2 % ointment   Nasal BID Gerald Quesada MD   Given at 05/26/24 0817    ondansetron disintegrating tablet 8 mg  8 mg Oral Q8H PRN Gerald Quesada MD        ondansetron injection 4 mg  4 mg Intravenous Q6H PRN Day, PANTERA Huston        potassium chloride 20 mEq in 100 mL IVPB (FOR CENTRAL LINE ADMINISTRATION ONLY)  20 mEq Intravenous Q2H Art Petty MD 50 mL/hr at 05/26/24 0840 20 mEq at 05/26/24 0840    prochlorperazine injection Soln 5 mg  5 mg Intravenous Q6H PRN Day, PANTERA Huston        propofol (DIPRIVAN) 10 mg/mL infusion  0-50 mcg/kg/min Intravenous Continuous Susanna Strauss MD 10.3 mL/hr at 05/25/24 1605 20 mcg/kg/min at 05/25/24 1605    sodium chloride 0.9% flush 10 mL  10 mL Intravenous PRN Gerald Quesada MD        sodium chloride 0.9% flush 10 mL  10 mL Intravenous Q6H Gerald Quesada MD   10 mL at 05/26/24 0611    And    sodium chloride 0.9% flush 10 mL  10 mL Intravenous PRN Gerald Quesada MD        sodium chloride 3% HYPERTONIC solution  60 mL/hr Intravenous Continuous Gerald Quesada MD 60 mL/hr at 05/26/24 0926 60 mL/hr at 05/26/24 0926     Facility-Administered Medications Ordered in Other Encounters   Medication Dose Route Frequency Provider Last Rate Last Admin    ceFAZolin injection   Intravenous PRN Sherman Beltran CRNA   2 g at 05/25/24 1426    electrolyte-A infusion   Intravenous Continuous PRN Sherman Beltran CRNA   New Bag at 05/25/24 1404    fentaNYL injection   Intravenous PRN Sherman Beltran, CRNA   100 mcg at 05/25/24 1408    phenylephrine HCl in 0.9% NaCl 1 mg/10 mL (100 mcg/mL) syringe   Intravenous PRN Sherman Beltran, CRNA   100  mcg at 05/25/24 1413    rocuronium injection   Intravenous PRN Sherman Beltran, CRNA   100 mg at 05/25/24 1408       Review of Systems   Unable to perform ROS: Patient nonverbal     Objective:     Vital Signs (Most Recent):  Temp: 99.1 °F (37.3 °C) (05/26/24 0800)  Pulse: 80 (05/26/24 0930)  Resp: (!) 21 (05/26/24 0930)  BP: 128/61 (05/26/24 0917)  SpO2: 100 % (05/26/24 0930) Vital Signs (24h Range):  Temp:  [98.3 °F (36.8 °C)-100.1 °F (37.8 °C)] 99.1 °F (37.3 °C)  Pulse:  [] 80  Resp:  [13-30] 21  SpO2:  [95 %-100 %] 100 %  BP: ()/() 128/61  Arterial Line BP: (111-191)/(37-72) 126/52     Weight: 84.9 kg (187 lb 2.7 oz)  Body mass index is 26.86 kg/m².     Physical Exam  Vitals and nursing note reviewed.   HENT:      Head:      Comments: Crani incision site with dressing, clean dry intact    Eyes:      Pupils: Pupils are equal, round, and reactive to light.   Pulmonary:      Effort: Accessory muscle usage present.   Musculoskeletal:      Right lower leg: No edema.      Left lower leg: No edema.   Skin:     General: Skin is warm and dry.      Capillary Refill: Capillary refill takes less than 2 seconds.   Neurological:      Mental Status: He is lethargic.      Comments:     Limited PE  Does not participate in exam  Global aphasia   Appears to move LUE purposeful, has a mitten on but does not follow commands  Appears to move LLE spontaneously on bed  Withdraws from painful stim RUE, RLE  left sided gaze, does not cross midline to look to the right  Lethargic with nasal trumpet in          NEUROLOGICAL EXAMINATION:     CRANIAL NERVES     CN III, IV, VI   Pupils are equal, round, and reactive to light.      Significant Labs:   Recent Lab Results  (Last 5 results in the past 24 hours)        05/26/24  0752   05/26/24  0737   05/26/24  0604   05/26/24  0520   05/26/24  0458        Albumin/Globulin Ratio         0.9       Albumin         2.9       ALP         38       Allens Test N/A       N/A         ALT          12       Anion Gap         8.0       AST         16       Baso #         0.03       Basophil %         0.3       BILIRUBIN TOTAL         0.3       BUN         10.2       BUN/CREAT RATIO         12       Calcium         8.3       Calcium Level Ionized 1.15       1.21         Chloride         122       CO2         22       Creatinine         0.87       Drawn by ksfb rrt       rcl crt         eGFR         >60       Eos #         0.00       Eos %         0.0       Globulin, Total         3.2       Glucose         159       Hematocrit         33.3       Hemoglobin         11.0       Immature Grans (Abs)         0.04       Immature Granulocytes         0.3       LPM 15       6         Lymph #         0.58       LYMPH %         4.9       Magnesium    2.20             MCH         31.0       MCHC         33.0       MCV         93.8       Mono #         1.04       Mono %         8.9       MPV         10.4       Neut #         10.06       Neut %         85.6       nRBC         0.0       O2 Hb, Blood Gas       92.0         Osmolality   317             Oxygen Device, Blood gas Non Rebreather       Oxy Mask         Phosphorus Level   2.0             Platelet Count         178       Base Excess, Blood gas 0.10       -0.90         CO Hgb       0.8         POC HCO3 23.6       22.2         Met Hgb       0.7         POC PCO2 34.0       32.0         POC PH 7.450       7.450         POC PO2 104.0       63.0         Potassium, Blood Gas 3.2       3.4         Sodium, Blood Gas 152       146         POCT Glucose     145           Potassium         3.3       PROTEIN TOTAL         6.1       RBC         3.55       RDW         14.4       Sample site Arterial Line       Arterial Line         Sample Type Arterial Blood       Arterial Blood         sO2, Blood gas 98.0       92.8         Sodium         152       TOC2, Blood gas 24.6       23.2         THb, Blood gas       15.4         WBC         11.75                               Significant Imaging: I have reviewed all pertinent imaging results/findings within the past 24 hours.  Assessment and Plan:     Acute cerebrovascular accident (CVA) due to occlusion of left middle cerebral artery  -63 M with left MCA occlusion transferred from OSH for intervention. LKN 1700. Not a candidate for TNK.        Patient underwent mechanical thrombectomy and carotid angioplasty. No stent was deployed due to risk of hemorrhagic transformation.      -CT head:   Loss of gray-white matter differentiation in the left MCA vascular territory as detailed above, concordant with acute ischemic infarct.  Subtle foci of hyperattenuation are noted within the left basal ganglia, left insular ribbon, and scattered in the left frontoparietal cortex which is indeterminate and could represent contrast staining versus petechial hemorrhage.  No evidence of gross hemorrhagic transformation.     Trace left-to-right midline shift.      Plan:  - q1h neurochecks    - s/p crani   - continue 3% NaCl at 60 cc/hr  - Na and S. Osm goal q6h    - Na goal 145-150  - continue aspirin 300 mg rectal    - -160  - MRI brain when able  - continue therapy efforts       Further recommendations to follow from MD    VTE Risk Mitigation (From admission, onward)           Ordered     Reason for No Pharmacological VTE Prophylaxis  Once        Question:  Reasons:  Answer:  Risk of Bleeding    05/24/24 1140     IP VTE HIGH RISK PATIENT  Once         05/24/24 1140     Place sequential compression device  Until discontinued         05/24/24 1140                    Radha Stiles NP  Neurology  [facility] - 7th Floor ICU

## 2024-05-26 NOTE — ASSESSMENT & PLAN NOTE
-63 M with left MCA occlusion transferred from OSH for intervention. LKN 1700. Not a candidate for TNK.        Patient underwent mechanical thrombectomy and carotid angioplasty. No stent was deployed due to risk of hemorrhagic transformation.      -CT head:   Loss of gray-white matter differentiation in the left MCA vascular territory as detailed above, concordant with acute ischemic infarct.  Subtle foci of hyperattenuation are noted within the left basal ganglia, left insular ribbon, and scattered in the left frontoparietal cortex which is indeterminate and could represent contrast staining versus petechial hemorrhage.  No evidence of gross hemorrhagic transformation.     Trace left-to-right midline shift.      Plan:  - q1h neurochecks    - s/p crani   - continue 3% NaCl at 60 cc/hr  - Na and S. Osm goal q6h    - Na goal 145-150  - continue aspirin 300 mg rectal    - -160  - MRI brain when able  - continue therapy efforts

## 2024-05-26 NOTE — CONSULTS
[facility]                       [address] [address]    PATIENT NAME:       ROBERT SANTOS  YOB: 1960  CSN:                [id number]   MRN:                [id number]  ADMIT DATE:         05/24/2024 01:11:00  PHYSICIAN:          Rico Adam MD                            CONSULTATION    DATE OF CONSULT:      Thank you Dr. Rai for letting me see Robert Santos.  who called me a little   while ago regarding  emergency craniectomy.  Apparently, he came with a   thrombus, yesterday had embolectomy.  This morning, there was more swelling and   neuro change of dilated pupil on the left side, somewhat larger with early stage of herniation.  I was   called emergently and taken to the operating room urgently.  I was unable to   talk with the family.  I will talk to the family afterwards.  Supposedly, we   will do a craniectomy with large bone removal.  The prognosis is poor.  We will   proceed from there.        ______________________________  Rico Adam MD    IM/AQS  DD:  05/25/2024  Time:  02:09PM  DT:  05/25/2024  Time:  06:46PM  Job #:  [id number]      CONSULTATION

## 2024-05-26 NOTE — TREATMENT PLAN
GI Follow-up Note    Team decided to not go forward with NG tube placement today, if plans change, please consult and contact us.    Thank you for allowing us to participate in the care of Robert Degroot. Please do not hesitate to call us with questions.    Roberto Stiles  Gastroenterology

## 2024-05-26 NOTE — PROGRESS NOTES
Awake, opens eyes.  Moves left side well. Weak right side.  Cat scan shows CVA/less shift.  Will follow.

## 2024-05-26 NOTE — PROGRESS NOTES
[facility] - ICU  Pulmonary Critical Care Note    Patient Name: Robert Degroot  MRN: [id number]  Admission Date: 5/24/2024  Hospital Length of Stay: 2 days  Code Status: Full Code  Attending Provider: MILADY Rai MD  Primary Care Provider: Maria Del Rosario, Primary Doctor     Subjective:     HPI:   Mr. Degroot is a 62 y/o male with unknown past medical history who presented to [facility] as a transfer from [place] for mechanical thrombectomy secondary to acute CVA.  Per chart review it appears patient attempted to take a nap earlier in the day after which he woke up experiencing hemineglect, aphasia, and right-sided agnosia with right-sided flaccid paralysis and facial droop. CTA head and neck was performed upon arrival to ED showing a non-opacification of the proximal to distal M1 segment of the left MCA suggesting occlusion or severe stenosis.  Last known normal was approx 1700 and patient was not in window for TPA. He arrives to the ICU s/p successful mechanical thrombectomy and carotid angioplasty.  Further history is limited secondary to intubation and sedation.     Hospital Course/Significant events:  5/24/2024: Patient admitted to the ICU s/p mechanical thrombectomy and carotid angioplasty.      24 Hour Interval History:  Patient had a deterioration of his neuro status yesterday and was found to be proceeding towards early herniation.  He was brought emergently to the operating room where he underwent a decompressive craniectomy.  He returned to the ICU but later self-extubated.  His respiratory status has remained relatively stable afterwards.      Current Inpatient Medications   aspirin  300 mg Rectal Daily    atorvastatin  80 mg Oral Daily    ceFAZolin (Ancef) IV (PEDS and ADULTS)  2 g Intravenous Q8H    famotidine (PF)  20 mg Intravenous Q12H    levETIRAcetam (Keppra) IV (PEDS and ADULTS)  500 mg Intravenous Q12H    mupirocin   Nasal BID    potassium chloride in water  20 mEq Intravenous Q2H    sodium  chloride 0.9%  10 mL Intravenous Q6H       Current Intravenous Infusions   sodium chloride 0.9%   Intravenous Continuous 100 mL/hr at 05/26/24 0703 Rate Verify at 05/26/24 0703    clevidipine  0-32 mg/hr Intravenous Continuous 28 mL/hr at 05/26/24 0703 14 mg/hr at 05/26/24 0703    labetaloL (NORMODYNE,TRANDATE) 500 mg in 100 mL infusion (conc: 5 mg/mL)  0-3 mg/min Intravenous Continuous 3.6 mL/hr at 05/26/24 0927 0.3 mg/min at 05/26/24 0927    propofoL  0-50 mcg/kg/min Intravenous Continuous 10.3 mL/hr at 05/25/24 1605 20 mcg/kg/min at 05/25/24 1605    sodium chloride 3% HYPERTONIC  60 mL/hr Intravenous Continuous 60 mL/hr at 05/26/24 0926 60 mL/hr at 05/26/24 0926                Objective:       Intake/Output Summary (Last 24 hours) at 5/26/2024 1018  Last data filed at 5/26/2024 0703  Gross per 24 hour   Intake 4643.73 ml   Output 1300 ml   Net 3343.73 ml         Vital Signs (Most Recent):  Temp: 99.1 °F (37.3 °C) (05/26/24 0800)  Pulse: 80 (05/26/24 0930)  Resp: (!) 21 (05/26/24 0930)  BP: 128/61 (05/26/24 0917)  SpO2: 100 % (05/26/24 0930)  Body mass index is 26.86 kg/m².  Weight: 84.9 kg (187 lb 2.7 oz) Vital Signs (24h Range):  Temp:  [98.3 °F (36.8 °C)-100.1 °F (37.8 °C)] 99.1 °F (37.3 °C)  Pulse:  [] 80  Resp:  [13-30] 21  SpO2:  [95 %-100 %] 100 %  BP: ()/() 128/61  Arterial Line BP: (111-191)/(37-72) 126/52     Physical Exam  Vitals reviewed.   Constitutional:       General: He is not in acute distress.     Appearance: He is normal weight. He is ill-appearing. He is not diaphoretic.      Comments: Non-rebreather mask is in place.  Patient arousable but nonverbal and did not follow commands   HENT:      Head: Normocephalic.      Comments: Dressing and drain in place  Eyes:      Pupils: Pupils are equal, round, and reactive to light.   Cardiovascular:      Rate and Rhythm: Normal rate and regular rhythm.      Pulses: Normal pulses.      Heart sounds: Normal heart sounds. No murmur  heard.  Pulmonary:      Breath sounds: Rhonchi present. No wheezing or rales.      Comments: No resp distress  Abdominal:      General: Bowel sounds are normal. There is no distension.      Palpations: Abdomen is soft.   Musculoskeletal:         General: No swelling.   Skin:     General: Skin is warm and dry.      Findings: No bruising or lesion.   Neurological:      Comments: Patient with persistent right-sided hemiparesis.  He does move the left side.  Left mitten is in place             Lines/Drains/Airways       Peripherally Inserted Central Catheter Line  Duration             PICC Triple Lumen 05/24/24 1844 right brachial 1 day              Drain  Duration             Male External Urinary Catheter 05/24/24 0400 Medium 2 days         Closed/Suction Drain 05/25/24 1505 Tube - 1 Scalp Bulb 10 Fr. <1 day              Arterial Line  Duration             Arterial Line 05/25/24 1410 Right Radial <1 day              Peripheral Intravenous Line  Duration                  Peripheral IV - Single Lumen 18 G Left Antecubital -- days         Peripheral IV - Single Lumen 18 G Posterior;Right Hand -- days         Peripheral IV - Single Lumen 05/24/24 1139 Right;Posterior Forearm 1 day                    Significant Labs:    Lab Results   Component Value Date    WBC 11.75 (H) 05/26/2024    HGB 11.0 (L) 05/26/2024    HCT 33.3 (L) 05/26/2024    MCV 93.8 05/26/2024     05/26/2024         BMP  Lab Results   Component Value Date     (H) 05/26/2024    K 3.3 (L) 05/26/2024    CHLORIDE 122 (H) 05/26/2024    CO2 22 (L) 05/26/2024    BUN 10.2 05/26/2024    CREATININE 0.87 05/26/2024    CALCIUM 8.3 (L) 05/26/2024    AGAP 8.0 05/26/2024    EGFRNONAA >60 08/30/2021       ABG  Recent Labs   Lab 05/24/24  0005 05/26/24  0520 05/26/24  0752   PH 7.374   < > 7.450   PO2 77*   < > 104.0*   PCO2 39.0   < > 34.0*   HCO3 22.8*   < > 23.6   BE -2  --   --     < > = values in this interval not displayed.       Mechanical Ventilation  Support:  Vent Mode: CPAP / PSV (05/25/24 2032)  Set Rate: 12 BPM (05/25/24 1401)  Vt Set: 500 mL (05/25/24 1401)  Pressure Support: 8 cmH20 (05/25/24 2032)  PEEP/CPAP: 5 cmH20 (05/25/24 2032)  Oxygen Concentration (%): 30 (05/25/24 2200)  Peak Airway Pressure: 14 cmH20 (05/25/24 2032)  Total Ve: 11.7 L/m (05/25/24 2032)  F/VT Ratio<105 (RSBI): (!) 30.02 (05/25/24 1831)    Significant Imaging:  I have reviewed the pertinent imaging within the past 24 hours.        Assessment/Plan:     Assessment  CVA with left MCA occlusion s/p emergent mechanical thrombectomy and carotid angioplasty   HLD, HTN  Cerebral edema with impending herniation now status post decompressive craniectomy  Acute respiratory failure secondary to neurologic dysfunction-status post self-extubation    Plan  Patient's respiratory status is variable.  He has not particularly distressed.  He may however not be able to continue to maintain his airway.  We will monitor closely it if he demonstrates any further deterioration we will proceed with re-intubation.  Continue close neuro monitoring and management of blood pressure parameters.    31 minutes of critical care was time spent personally by me on the following activities: development of treatment plan with patient or surrogate and bedside caregivers, discussions with consultants, evaluation of patient's response to treatment, examination of patient, ordering and performing treatments and interventions, ordering and review of laboratory studies, ordering and review of radiographic studies, pulse oximetry, re-evaluation of patient's condition.  This critical care time did not overlap with that of any other provider or involve time for any procedures.     Ricky Rodas MD  Pulmonary Critical Care Medicine  [facility] - ICU

## 2024-05-26 NOTE — OP NOTE
[facility]                       [address] [address]    PATIENT NAME:      LIAN SANTOS  YOB: 1960  CSN:               [id number]  MRN:               [id number]  ADMIT DATE:        05/24/2024 01:11:00  PHYSICIAN:         Rico Adam MD                          OPERATIVE REPORT      DATE OF SURGERY:        SURGEON:  Rico Adam MD    ASSISTANT:  Robel Wagoner.    PREOPERATIVE DIAGNOSIS:  Large left stroke with shift edema, early stage   herniation.    POSTOPERATIVE DIAGNOSES:  Large left stroke with shift edema, early stage   herniation.    PROCEDURE:  Left frontoparietal temporal craniectomy with removal of dura and   relaxation of brain, placement of drain.    COMPLICATIONS:  There were no issues, no complications.    INDICATION FOR SURGERY:  The patient is a 63-year-old who called a while ago as   there was an emergency for stroke with shift edema with early stage herniation   and dilated left pupil, now here for craniectomy.  Consent was emergently   acknowledged by the family in the hospital.  The patient already being moved to   the operating roomas per the neurologist.    OPERATIVE PROCEDURE:  In the operating room, head put in pins, head was shaved, The head was   prepped and draped and did an incision from zygomatic going backwards and   forwards reflecting everything forward and put the several wayne holes and a   large bone flap was removed.  Further bone was removed.  A lot of bone was   removed going posterior midline anteriorly.  Once that was done, we opened the   dura, obtained hemostasis relaxed the brain and put some DuraGen and fibrin glue.  A   drain left in place, secured.   Wound was closed with 3-0 Vicryl and staples.    The drain was working.  There were no issues or complications.    ______________________________  Rico Adam MD    IM/AQS  DD:  05/25/2024  Time:   03:25PM  DT:  05/25/2024  Time:  07:55PM  Job #:  [id number]      OPERATIVE REPORT

## 2024-05-26 NOTE — SUBJECTIVE & OBJECTIVE
Subjective:     Interval History:   No new issues, underwent crani yesterday afternoon, nursing reports since crani he has improved compared to immediately prior to crani.     Current Neurological Medications:     Current Facility-Administered Medications   Medication Dose Route Frequency Provider Last Rate Last Admin    0.9%  NaCl infusion   Intravenous Continuous Day, PANTERA Huston 100 mL/hr at 05/26/24 0703 Rate Verify at 05/26/24 0703    acetaminophen suppository 650 mg  650 mg Rectal Q4H PRN Day, PANTERA Huston        acetaminophen tablet 650 mg  650 mg Oral Q4H PRN Day, PANTERA Huston        aspirin suppository 300 mg  300 mg Rectal Daily Radha Stiles NP   300 mg at 05/26/24 0817    atorvastatin tablet 80 mg  80 mg Oral Daily Susanna Strauss MD        bisacodyL suppository 10 mg  10 mg Rectal Daily PRN Radha Stiles NP        cefazolin (ANCEF) 2 gram in dextrose 5% 50 mL IVPB (premix)  2 g Intravenous Q8H Day, PANTERA Huston   Stopped at 05/26/24 0847    clevidipine (CLEVIPREX) 25 mg/50 mL infusion  0-32 mg/hr Intravenous Continuous Ricky Rodas MD 28 mL/hr at 05/26/24 0703 14 mg/hr at 05/26/24 0703    famotidine (PF) injection 20 mg  20 mg Intravenous Q12H Day, PANTERA Huston   20 mg at 05/26/24 0817    hydrALAZINE injection 10 mg  10 mg Intravenous Q6H PRN Susanna Strauss MD   10 mg at 05/25/24 1655    HYDROcodone-acetaminophen 5-325 mg per tablet 1 tablet  1 tablet Oral Q4H PRN Day, PANTERA Huston        labetaloL (NORMODYNE,TRANDATE) 500 mg in 100 mL infusion (conc: 5 mg/mL)  0-3 mg/min Intravenous Continuous Dinh Pan MD 3.6 mL/hr at 05/26/24 0927 0.3 mg/min at 05/26/24 0927    levETIRAcetam (Keppra) 500 mg in dextrose 5 % in water (D5W) 100 mL IVPB (MB+)  500 mg Intravenous Q12H Gerald Quesada  mL/hr at 05/26/24 0851 500 mg at 05/26/24 0851    morphine injection 2 mg  2 mg Intravenous Q4H PRN Day, PANTERA Huston   2 mg at 05/25/24 1719    mupirocin 2 % ointment    Nasal BID Gerald Quesada MD   Given at 05/26/24 0817    ondansetron disintegrating tablet 8 mg  8 mg Oral Q8H PRN Gerald Quesada MD        ondansetron injection 4 mg  4 mg Intravenous Q6H PRN Rizwana, PANTERA Huston        potassium chloride 20 mEq in 100 mL IVPB (FOR CENTRAL LINE ADMINISTRATION ONLY)  20 mEq Intravenous Q2H Art Petty MD 50 mL/hr at 05/26/24 0840 20 mEq at 05/26/24 0840    prochlorperazine injection Soln 5 mg  5 mg Intravenous Q6H PRN Day, PANTERA Huston        propofol (DIPRIVAN) 10 mg/mL infusion  0-50 mcg/kg/min Intravenous Continuous Susanna Strauss MD 10.3 mL/hr at 05/25/24 1605 20 mcg/kg/min at 05/25/24 1605    sodium chloride 0.9% flush 10 mL  10 mL Intravenous PRN Gerald Quesada MD        sodium chloride 0.9% flush 10 mL  10 mL Intravenous Q6H Gerald Quesada MD   10 mL at 05/26/24 0611    And    sodium chloride 0.9% flush 10 mL  10 mL Intravenous PRN Gerald Quesada MD        sodium chloride 3% HYPERTONIC solution  60 mL/hr Intravenous Continuous Gerald Quesada MD 60 mL/hr at 05/26/24 0926 60 mL/hr at 05/26/24 0926     Facility-Administered Medications Ordered in Other Encounters   Medication Dose Route Frequency Provider Last Rate Last Admin    ceFAZolin injection   Intravenous PRN Sherman Beltran P, CRNA   2 g at 05/25/24 1426    electrolyte-A infusion   Intravenous Continuous PRN Sherman Beltran P, CRNA   New Bag at 05/25/24 1404    fentaNYL injection   Intravenous PRN Sherman Beltran P, CRNA   100 mcg at 05/25/24 1408    phenylephrine HCl in 0.9% NaCl 1 mg/10 mL (100 mcg/mL) syringe   Intravenous PRN HemphillSherman P, CRNA   100 mcg at 05/25/24 1413    rocuronium injection   Intravenous PRN Hemphill, Sherman P, CRNA   100 mg at 05/25/24 1408       Review of Systems   Unable to perform ROS: Patient nonverbal     Objective:     Vital Signs (Most Recent):  Temp: 99.1 °F (37.3 °C) (05/26/24 0800)  Pulse: 80 (05/26/24 0930)  Resp: (!) 21 (05/26/24 0930)  BP: 128/61 (05/26/24  0917)  SpO2: 100 % (05/26/24 0930) Vital Signs (24h Range):  Temp:  [98.3 °F (36.8 °C)-100.1 °F (37.8 °C)] 99.1 °F (37.3 °C)  Pulse:  [] 80  Resp:  [13-30] 21  SpO2:  [95 %-100 %] 100 %  BP: ()/() 128/61  Arterial Line BP: (111-191)/(37-72) 126/52     Weight: 84.9 kg (187 lb 2.7 oz)  Body mass index is 26.86 kg/m².     Physical Exam  Vitals and nursing note reviewed.   HENT:      Head:      Comments: Crani incision site with dressing, clean dry intact    Eyes:      Pupils: Pupils are equal, round, and reactive to light.   Pulmonary:      Effort: Accessory muscle usage present.   Musculoskeletal:      Right lower leg: No edema.      Left lower leg: No edema.   Skin:     General: Skin is warm and dry.      Capillary Refill: Capillary refill takes less than 2 seconds.   Neurological:      Mental Status: He is lethargic.      Comments:     Limited PE  Does not participate in exam  Global aphasia   Appears to move LUE purposeful, has a mitten on but does not follow commands  Appears to move LLE spontaneously on bed  Withdraws from painful stim RUE, RLE  left sided gaze, does not cross midline to look to the right  Lethargic with nasal trumpet in          NEUROLOGICAL EXAMINATION:     CRANIAL NERVES     CN III, IV, VI   Pupils are equal, round, and reactive to light.      Significant Labs:   Recent Lab Results  (Last 5 results in the past 24 hours)        05/26/24  0752   05/26/24  0737   05/26/24  0604   05/26/24  0520   05/26/24  0458        Albumin/Globulin Ratio         0.9       Albumin         2.9       ALP         38       Allens Test N/A       N/A         ALT         12       Anion Gap         8.0       AST         16       Baso #         0.03       Basophil %         0.3       BILIRUBIN TOTAL         0.3       BUN         10.2       BUN/CREAT RATIO         12       Calcium         8.3       Calcium Level Ionized 1.15       1.21         Chloride         122       CO2         22       Creatinine          0.87       Drawn by ksfb rrt       rcl crt         eGFR         >60       Eos #         0.00       Eos %         0.0       Globulin, Total         3.2       Glucose         159       Hematocrit         33.3       Hemoglobin         11.0       Immature Grans (Abs)         0.04       Immature Granulocytes         0.3       LPM 15       6         Lymph #         0.58       LYMPH %         4.9       Magnesium    2.20             MCH         31.0       MCHC         33.0       MCV         93.8       Mono #         1.04       Mono %         8.9       MPV         10.4       Neut #         10.06       Neut %         85.6       nRBC         0.0       O2 Hb, Blood Gas       92.0         Osmolality   317             Oxygen Device, Blood gas Non Rebreather       Oxy Mask         Phosphorus Level   2.0             Platelet Count         178       Base Excess, Blood gas 0.10       -0.90         CO Hgb       0.8         POC HCO3 23.6       22.2         Met Hgb       0.7         POC PCO2 34.0       32.0         POC PH 7.450       7.450         POC PO2 104.0       63.0         Potassium, Blood Gas 3.2       3.4         Sodium, Blood Gas 152       146         POCT Glucose     145           Potassium         3.3       PROTEIN TOTAL         6.1       RBC         3.55       RDW         14.4       Sample site Arterial Line       Arterial Line         Sample Type Arterial Blood       Arterial Blood         sO2, Blood gas 98.0       92.8         Sodium         152       TOC2, Blood gas 24.6       23.2         THb, Blood gas       15.4         WBC         11.75                              Significant Imaging: I have reviewed all pertinent imaging results/findings within the past 24 hours.

## 2024-05-26 NOTE — PLAN OF CARE
Problem: Occupational Therapy  Goal: Occupational Therapy Goal  Description: LTG: Pt will perform basic ADLs from w/c level with mod A using LRAD by d/c.    STG: to be met by 6/21  1. Pt will follow >50% of simple one step commands  2. Pt will perform grooming EOB with mod A.   3. Pt will perform functional grasp/reach/release of items >50% of trials in prep for increased participation in ADL tasks.   4. Pt will perform sit<>stand with mod A x 2 in prep for ADL t/fs.   Outcome: Progressing

## 2024-05-27 LAB
ALBUMIN SERPL-MCNC: 2.5 G/DL (ref 3.4–4.8)
ALBUMIN SERPL-MCNC: 2.7 G/DL (ref 3.4–4.8)
ALBUMIN/GLOB SERPL: 0.6 RATIO (ref 1.1–2)
ALBUMIN/GLOB SERPL: 0.9 RATIO (ref 1.1–2)
ALLENS TEST BLOOD GAS (OHS): ABNORMAL
ALP SERPL-CCNC: 44 UNIT/L (ref 40–150)
ALP SERPL-CCNC: 59 UNIT/L (ref 40–150)
ALT SERPL-CCNC: 20 UNIT/L (ref 0–55)
ALT SERPL-CCNC: 39 UNIT/L (ref 0–55)
ANION GAP SERPL CALC-SCNC: 7 MEQ/L
ANION GAP SERPL CALC-SCNC: 9 MEQ/L
AST SERPL-CCNC: 26 UNIT/L (ref 5–34)
AST SERPL-CCNC: 44 UNIT/L (ref 5–34)
BASE EXCESS BLD CALC-SCNC: 1.7 MMOL/L (ref -2–2)
BASOPHILS # BLD AUTO: 0.03 X10(3)/MCL
BASOPHILS NFR BLD AUTO: 0.3 %
BILIRUB SERPL-MCNC: 0.4 MG/DL
BILIRUB SERPL-MCNC: 0.4 MG/DL
BLOOD GAS SAMPLE TYPE (OHS): ABNORMAL
BUN SERPL-MCNC: 11.1 MG/DL (ref 8.4–25.7)
BUN SERPL-MCNC: 13.9 MG/DL (ref 8.4–25.7)
CA-I BLD-SCNC: 1.23 MMOL/L (ref 1.12–1.23)
CALCIUM SERPL-MCNC: 8.4 MG/DL (ref 8.8–10)
CALCIUM SERPL-MCNC: 9.1 MG/DL (ref 8.8–10)
CHLORIDE SERPL-SCNC: 126 MMOL/L (ref 98–107)
CHLORIDE SERPL-SCNC: 127 MMOL/L (ref 98–107)
CO2 BLDA-SCNC: 27.9 MMOL/L
CO2 SERPL-SCNC: 21 MMOL/L (ref 23–31)
CO2 SERPL-SCNC: 25 MMOL/L (ref 23–31)
COHGB MFR BLDA: 0.8 % (ref 0.5–1.5)
CREAT SERPL-MCNC: 0.8 MG/DL (ref 0.73–1.18)
CREAT SERPL-MCNC: 0.84 MG/DL (ref 0.73–1.18)
CREAT/UREA NIT SERPL: 14
CREAT/UREA NIT SERPL: 17
DRAWN BY BLOOD GAS (OHS): ABNORMAL
EOSINOPHIL # BLD AUTO: 0 X10(3)/MCL (ref 0–0.9)
EOSINOPHIL NFR BLD AUTO: 0 %
ERYTHROCYTE [DISTWIDTH] IN BLOOD BY AUTOMATED COUNT: 14.6 % (ref 11.5–17)
GFR SERPLBLD CREATININE-BSD FMLA CKD-EPI: >60 ML/MIN/1.73/M2
GFR SERPLBLD CREATININE-BSD FMLA CKD-EPI: >60 ML/MIN/1.73/M2
GLOBULIN SER-MCNC: 2.9 GM/DL (ref 2.4–3.5)
GLOBULIN SER-MCNC: 4.2 GM/DL (ref 2.4–3.5)
GLUCOSE SERPL-MCNC: 134 MG/DL (ref 82–115)
GLUCOSE SERPL-MCNC: 135 MG/DL (ref 70–110)
GLUCOSE SERPL-MCNC: 146 MG/DL (ref 82–115)
HCO3 BLDA-SCNC: 26.6 MMOL/L (ref 22–26)
HCT VFR BLD AUTO: 32.1 % (ref 42–52)
HGB BLD-MCNC: 10.8 G/DL (ref 14–18)
IMM GRANULOCYTES # BLD AUTO: 0.04 X10(3)/MCL (ref 0–0.04)
IMM GRANULOCYTES NFR BLD AUTO: 0.3 %
LYMPHOCYTES # BLD AUTO: 0.73 X10(3)/MCL (ref 0.6–4.6)
LYMPHOCYTES NFR BLD AUTO: 6.4 %
MAGNESIUM SERPL-MCNC: 2.4 MG/DL (ref 1.6–2.6)
MCH RBC QN AUTO: 31.7 PG (ref 27–31)
MCHC RBC AUTO-ENTMCNC: 33.6 G/DL (ref 33–36)
MCV RBC AUTO: 94.1 FL (ref 80–94)
METHGB MFR BLDA: 0.1 % (ref 0.4–1.5)
MONOCYTES # BLD AUTO: 0.97 X10(3)/MCL (ref 0.1–1.3)
MONOCYTES NFR BLD AUTO: 8.5 %
NEUTROPHILS # BLD AUTO: 9.69 X10(3)/MCL (ref 2.1–9.2)
NEUTROPHILS NFR BLD AUTO: 84.5 %
NRBC BLD AUTO-RTO: 0 %
O2 HB BLOOD GAS (OHS): 97 % (ref 94–97)
OSMOLALITY SERPL: 323 MOSM/KG (ref 280–300)
OSMOLALITY SERPL: 325 MOSM/KG (ref 280–300)
OSMOLALITY SERPL: 326 MOSM/KG (ref 280–300)
OSMOLALITY SERPL: 326 MOSM/KG (ref 280–300)
OXYGEN DEVICE BLOOD GAS (OHS): ABNORMAL
OXYHGB MFR BLDA: 10.9 G/DL (ref 12–16)
PCO2 BLDA: 42 MMHG (ref 35–45)
PH BLDA: 7.41 [PH] (ref 7.35–7.45)
PHOSPHATE SERPL-MCNC: 2.1 MG/DL (ref 2.3–4.7)
PLATELET # BLD AUTO: 174 X10(3)/MCL (ref 130–400)
PMV BLD AUTO: 10.2 FL (ref 7.4–10.4)
PO2 BLDA: 92 MMHG (ref 80–100)
POCT GLUCOSE: 135 MG/DL (ref 70–110)
POTASSIUM BLOOD GAS (OHS): 3.1 MMOL/L (ref 3.5–5)
POTASSIUM SERPL-SCNC: 3.2 MMOL/L (ref 3.5–5.1)
POTASSIUM SERPL-SCNC: 3.2 MMOL/L (ref 3.5–5.1)
PROT SERPL-MCNC: 5.6 GM/DL (ref 5.8–7.6)
PROT SERPL-MCNC: 6.7 GM/DL (ref 5.8–7.6)
RBC # BLD AUTO: 3.41 X10(6)/MCL (ref 4.7–6.1)
SAMPLE SITE BLOOD GAS (OHS): ABNORMAL
SAO2 % BLDA: 97.2 %
SODIUM BLOOD GAS (OHS): 153 MMOL/L (ref 137–145)
SODIUM SERPL-SCNC: 157 MMOL/L (ref 136–145)
SODIUM SERPL-SCNC: 157 MMOL/L (ref 136–145)
SODIUM SERPL-SCNC: 158 MMOL/L (ref 136–145)
WBC # SPEC AUTO: 11.46 X10(3)/MCL (ref 4.5–11.5)

## 2024-05-27 PROCEDURE — A4216 STERILE WATER/SALINE, 10 ML: HCPCS | Performed by: PSYCHIATRY & NEUROLOGY

## 2024-05-27 PROCEDURE — 83735 ASSAY OF MAGNESIUM: CPT | Performed by: STUDENT IN AN ORGANIZED HEALTH CARE EDUCATION/TRAINING PROGRAM

## 2024-05-27 PROCEDURE — 99900035 HC TECH TIME PER 15 MIN (STAT)

## 2024-05-27 PROCEDURE — 27000221 HC OXYGEN, UP TO 24 HOURS

## 2024-05-27 PROCEDURE — 25000003 PHARM REV CODE 250

## 2024-05-27 PROCEDURE — 80053 COMPREHEN METABOLIC PANEL: CPT | Performed by: STUDENT IN AN ORGANIZED HEALTH CARE EDUCATION/TRAINING PROGRAM

## 2024-05-27 PROCEDURE — 36415 COLL VENOUS BLD VENIPUNCTURE: CPT | Performed by: PSYCHIATRY & NEUROLOGY

## 2024-05-27 PROCEDURE — 93010 ELECTROCARDIOGRAM REPORT: CPT | Mod: ,,, | Performed by: INTERNAL MEDICINE

## 2024-05-27 PROCEDURE — 25000003 PHARM REV CODE 250: Performed by: NURSE PRACTITIONER

## 2024-05-27 PROCEDURE — 63600175 PHARM REV CODE 636 W HCPCS: Performed by: PSYCHIATRY & NEUROLOGY

## 2024-05-27 PROCEDURE — 94760 N-INVAS EAR/PLS OXIMETRY 1: CPT | Mod: XB

## 2024-05-27 PROCEDURE — 84295 ASSAY OF SERUM SODIUM: CPT | Performed by: PSYCHIATRY & NEUROLOGY

## 2024-05-27 PROCEDURE — 93005 ELECTROCARDIOGRAM TRACING: CPT

## 2024-05-27 PROCEDURE — 84100 ASSAY OF PHOSPHORUS: CPT | Performed by: STUDENT IN AN ORGANIZED HEALTH CARE EDUCATION/TRAINING PROGRAM

## 2024-05-27 PROCEDURE — C9248 INJ, CLEVIDIPINE BUTYRATE: HCPCS | Performed by: INTERNAL MEDICINE

## 2024-05-27 PROCEDURE — 31720 CLEARANCE OF AIRWAYS: CPT

## 2024-05-27 PROCEDURE — 80053 COMPREHEN METABOLIC PANEL: CPT

## 2024-05-27 PROCEDURE — 20000000 HC ICU ROOM

## 2024-05-27 PROCEDURE — 63600175 PHARM REV CODE 636 W HCPCS

## 2024-05-27 PROCEDURE — 97162 PT EVAL MOD COMPLEX 30 MIN: CPT

## 2024-05-27 PROCEDURE — 99497 ADVNCD CARE PLAN 30 MIN: CPT | Mod: 25,,, | Performed by: INTERNAL MEDICINE

## 2024-05-27 PROCEDURE — 25000003 PHARM REV CODE 250: Performed by: PSYCHIATRY & NEUROLOGY

## 2024-05-27 PROCEDURE — 83930 ASSAY OF BLOOD OSMOLALITY: CPT | Performed by: PSYCHIATRY & NEUROLOGY

## 2024-05-27 PROCEDURE — 99223 1ST HOSP IP/OBS HIGH 75: CPT | Mod: FS,,, | Performed by: INTERNAL MEDICINE

## 2024-05-27 PROCEDURE — 36415 COLL VENOUS BLD VENIPUNCTURE: CPT

## 2024-05-27 PROCEDURE — 85025 COMPLETE CBC W/AUTO DIFF WBC: CPT

## 2024-05-27 PROCEDURE — 37799 UNLISTED PX VASCULAR SURGERY: CPT

## 2024-05-27 PROCEDURE — 82803 BLOOD GASES ANY COMBINATION: CPT

## 2024-05-27 PROCEDURE — 25000003 PHARM REV CODE 250: Performed by: STUDENT IN AN ORGANIZED HEALTH CARE EDUCATION/TRAINING PROGRAM

## 2024-05-27 PROCEDURE — 63600175 PHARM REV CODE 636 W HCPCS: Performed by: INTERNAL MEDICINE

## 2024-05-27 PROCEDURE — 99232 SBSQ HOSP IP/OBS MODERATE 35: CPT | Mod: FS,,, | Performed by: PSYCHIATRY & NEUROLOGY

## 2024-05-27 RX ORDER — DILTIAZEM HYDROCHLORIDE 5 MG/ML
5 INJECTION INTRAVENOUS ONCE AS NEEDED
Status: COMPLETED | OUTPATIENT
Start: 2024-05-27 | End: 2024-05-28

## 2024-05-27 RX ORDER — SODIUM CHLORIDE 9 MG/ML
INJECTION, SOLUTION INTRAVENOUS CONTINUOUS
Status: DISCONTINUED | OUTPATIENT
Start: 2024-05-27 | End: 2024-05-29 | Stop reason: HOSPADM

## 2024-05-27 RX ORDER — METOPROLOL TARTRATE 1 MG/ML
5 INJECTION, SOLUTION INTRAVENOUS EVERY 5 MIN PRN
Status: COMPLETED | OUTPATIENT
Start: 2024-05-27 | End: 2024-05-28

## 2024-05-27 RX ORDER — METOPROLOL TARTRATE 1 MG/ML
INJECTION, SOLUTION INTRAVENOUS
Status: COMPLETED
Start: 2024-05-27 | End: 2024-05-27

## 2024-05-27 RX ORDER — POTASSIUM CHLORIDE 14.9 MG/ML
20 INJECTION INTRAVENOUS
Status: COMPLETED | OUTPATIENT
Start: 2024-05-27 | End: 2024-05-27

## 2024-05-27 RX ADMIN — ACETAMINOPHEN 650 MG: 650 SUPPOSITORY RECTAL at 11:05

## 2024-05-27 RX ADMIN — CLEVIPIDINE 12 MG/HR: 0.5 EMULSION INTRAVENOUS at 06:05

## 2024-05-27 RX ADMIN — MUPIROCIN: 20 OINTMENT TOPICAL at 08:05

## 2024-05-27 RX ADMIN — MORPHINE SULFATE 2 MG: 4 INJECTION INTRAVENOUS at 11:05

## 2024-05-27 RX ADMIN — CLEVIPIDINE 12 MG/HR: 0.5 EMULSION INTRAVENOUS at 02:05

## 2024-05-27 RX ADMIN — SODIUM CHLORIDE, PRESERVATIVE FREE 10 ML: 5 INJECTION INTRAVENOUS at 12:05

## 2024-05-27 RX ADMIN — POTASSIUM CHLORIDE 20 MEQ: 14.9 INJECTION, SOLUTION INTRAVENOUS at 05:05

## 2024-05-27 RX ADMIN — FAMOTIDINE 20 MG: 10 INJECTION, SOLUTION INTRAVENOUS at 08:05

## 2024-05-27 RX ADMIN — SODIUM CHLORIDE 250 ML: 9 INJECTION, SOLUTION INTRAVENOUS at 11:05

## 2024-05-27 RX ADMIN — CLEVIPIDINE 10 MG/HR: 0.5 EMULSION INTRAVENOUS at 01:05

## 2024-05-27 RX ADMIN — METOPROLOL TARTRATE 5 MG: 1 INJECTION, SOLUTION INTRAVENOUS at 08:05

## 2024-05-27 RX ADMIN — CLEVIPIDINE 10 MG/HR: 0.5 EMULSION INTRAVENOUS at 04:05

## 2024-05-27 RX ADMIN — LEVETIRACETAM 500 MG: 100 INJECTION, SOLUTION INTRAVENOUS at 08:05

## 2024-05-27 RX ADMIN — MORPHINE SULFATE 2 MG: 4 INJECTION INTRAVENOUS at 03:05

## 2024-05-27 RX ADMIN — CLEVIPIDINE 14 MG/HR: 0.5 EMULSION INTRAVENOUS at 06:05

## 2024-05-27 RX ADMIN — CLEVIPIDINE 10 MG/HR: 0.5 EMULSION INTRAVENOUS at 12:05

## 2024-05-27 RX ADMIN — CLEVIPIDINE 8 MG/HR: 0.5 EMULSION INTRAVENOUS at 08:05

## 2024-05-27 RX ADMIN — METOPROLOL TARTRATE 5 MG: 1 INJECTION, SOLUTION INTRAVENOUS at 09:05

## 2024-05-27 RX ADMIN — CLEVIPIDINE 12 MG/HR: 0.5 EMULSION INTRAVENOUS at 08:05

## 2024-05-27 RX ADMIN — CLEVIPIDINE 14 MG/HR: 0.5 EMULSION INTRAVENOUS at 05:05

## 2024-05-27 RX ADMIN — ASPIRIN 300 MG: 300 SUPPOSITORY RECTAL at 08:05

## 2024-05-27 RX ADMIN — POTASSIUM PHOSPHATE, MONOBASIC AND POTASSIUM PHOSPHATE, DIBASIC 30 MMOL: 224; 236 INJECTION, SOLUTION, CONCENTRATE INTRAVENOUS at 11:05

## 2024-05-27 RX ADMIN — SODIUM CHLORIDE, PRESERVATIVE FREE 10 ML: 5 INJECTION INTRAVENOUS at 06:05

## 2024-05-27 RX ADMIN — SODIUM CHLORIDE: 9 INJECTION, SOLUTION INTRAVENOUS at 11:05

## 2024-05-27 RX ADMIN — POTASSIUM CHLORIDE 20 MEQ: 14.9 INJECTION, SOLUTION INTRAVENOUS at 08:05

## 2024-05-27 RX ADMIN — CLEVIPIDINE 12 MG/HR: 0.5 EMULSION INTRAVENOUS at 11:05

## 2024-05-27 RX ADMIN — CLEVIPIDINE 14 MG/HR: 0.5 EMULSION INTRAVENOUS at 03:05

## 2024-05-27 NOTE — PROGRESS NOTES
[facility] - ICU  Pulmonary Critical Care Note    Patient Name: Robert Degroot  MRN: [id number]  Admission Date: 5/24/2024  Hospital Length of Stay: 3 days  Code Status: Full Code  Attending Provider: MILADY Rai MD  Primary Care Provider: Maria Del Rosario, Primary Doctor     Subjective:     HPI:   Mr. Degroot is a 64 y/o male with unknown past medical history who presented to [facility] as a transfer from [place] for mechanical thrombectomy secondary to acute CVA.  Per chart review it appears patient attempted to take a nap earlier in the day after which he woke up experiencing hemineglect, aphasia, and right-sided agnosia with right-sided flaccid paralysis and facial droop. CTA head and neck was performed upon arrival to ED showing a non-opacification of the proximal to distal M1 segment of the left MCA suggesting occlusion or severe stenosis.  Last known normal was approx 1700 and patient was not in window for TPA. He arrives to the ICU s/p successful mechanical thrombectomy and carotid angioplasty.  Further history is limited secondary to intubation and sedation.     Hospital Course/Significant events:  5/24/2024: Patient admitted to the ICU s/p mechanical thrombectomy and carotid angioplasty.      24 Hour Interval History:  Patient's neuro status and respiratory status have remained largely unchanged since yesterday.  He has in no distress.  He remains on Cleviprex.  He is nonverbal.  He continues to have hemiparesis on the right but does withdraw to noxious stimuli      Current Inpatient Medications   aspirin  300 mg Rectal Daily    atorvastatin  80 mg Oral Daily    famotidine (PF)  20 mg Intravenous Q12H    levETIRAcetam (Keppra) IV (PEDS and ADULTS)  500 mg Intravenous Q12H    mupirocin   Nasal BID    sodium chloride 0.9%  10 mL Intravenous Q6H       Current Intravenous Infusions   clevidipine  0-32 mg/hr Intravenous Continuous 16 mL/hr at 05/27/24 0855 8 mg/hr at 05/27/24 0855    labetaloL (NORMODYNE,TRANDATE)  500 mg in 100 mL infusion (conc: 5 mg/mL)  0-3 mg/min Intravenous Continuous   Stopped at 05/26/24 1157    sodium chloride 3% HYPERTONIC  60 mL/hr Intravenous Continuous 60 mL/hr at 05/27/24 0638 Rate Verify at 05/27/24 0638                Objective:       Intake/Output Summary (Last 24 hours) at 5/27/2024 0940  Last data filed at 5/27/2024 0638  Gross per 24 hour   Intake 2945.26 ml   Output 2920 ml   Net 25.26 ml         Vital Signs (Most Recent):  Temp: 99.5 °F (37.5 °C) (05/27/24 0800)  Pulse: 85 (05/27/24 0900)  Resp: 20 (05/27/24 0900)  BP: (!) 143/58 (05/27/24 0900)  SpO2: 97 % (05/27/24 0900)  Body mass index is 26.86 kg/m².  Weight: 84.9 kg (187 lb 2.7 oz) Vital Signs (24h Range):  Temp:  [98.8 °F (37.1 °C)-100.1 °F (37.8 °C)] 99.5 °F (37.5 °C)  Pulse:  [] 85  Resp:  [10-29] 20  SpO2:  [93 %-100 %] 97 %  BP: (113-167)/(53-99) 143/58  Arterial Line BP: (110-221)/() 142/50     Physical Exam  Vitals reviewed.   Constitutional:       General: He is not in acute distress.     Appearance: He is normal weight. He is ill-appearing. He is not diaphoretic.      Comments: oxymask is in place.  Patient arousable but nonverbal and did not follow commands   HENT:      Head: Normocephalic.      Comments: Dressing and drain in place  Eyes:      Pupils: Pupils are equal, round, and reactive to light.   Cardiovascular:      Rate and Rhythm: Normal rate and regular rhythm.      Pulses: Normal pulses.      Heart sounds: Normal heart sounds. No murmur heard.  Pulmonary:      Breath sounds: Rhonchi present. No wheezing or rales.      Comments: No resp distress  Abdominal:      General: Bowel sounds are normal. There is no distension.      Palpations: Abdomen is soft.   Musculoskeletal:         General: No swelling.   Skin:     General: Skin is warm and dry.      Findings: No bruising or lesion.   Neurological:      Comments: Patient with persistent right-sided hemiparesis.  He does move the left side.  Left mitten is  in place             Lines/Drains/Airways       Peripherally Inserted Central Catheter Line  Duration             PICC Triple Lumen 05/24/24 1844 right brachial 2 days              Drain  Duration             Male External Urinary Catheter 05/24/24 0400 Medium 3 days         Closed/Suction Drain 05/25/24 1505 Tube - 1 Scalp Bulb 10 Fr. 1 day              Arterial Line  Duration             Arterial Line 05/25/24 1410 Right Radial 1 day              Peripheral Intravenous Line  Duration                  Peripheral IV - Single Lumen 18 G Left Antecubital -- days         Peripheral IV - Single Lumen 18 G Posterior;Right Hand -- days         Peripheral IV - Single Lumen 05/24/24 1139 Right;Posterior Forearm 2 days                    Significant Labs:    Lab Results   Component Value Date    WBC 11.46 05/27/2024    HGB 10.8 (L) 05/27/2024    HCT 32.1 (L) 05/27/2024    MCV 94.1 (H) 05/27/2024     05/27/2024         BMP  Lab Results   Component Value Date     (H) 05/27/2024    K 3.2 (L) 05/27/2024    CHLORIDE 127 (HH) 05/27/2024    CO2 21 (L) 05/27/2024    BUN 11.1 05/27/2024    CREATININE 0.80 05/27/2024    CALCIUM 8.4 (L) 05/27/2024    AGAP 9.0 05/27/2024    EGFRNONAA >60 08/30/2021       ABG  Recent Labs   Lab 05/24/24  0005 05/26/24  0520 05/27/24  0556   PH 7.374   < > 7.410   PO2 77*   < > 92.0   PCO2 39.0   < > 42.0   HCO3 22.8*   < > 26.6*   BE -2  --   --     < > = values in this interval not displayed.       Mechanical Ventilation Support:  Vent Mode: CPAP / PSV (05/25/24 2032)  Set Rate: 12 BPM (05/25/24 1401)  Vt Set: 500 mL (05/25/24 1401)  Pressure Support: 8 cmH20 (05/25/24 2032)  PEEP/CPAP: 5 cmH20 (05/25/24 2032)  Oxygen Concentration (%): 30 (05/25/24 2200)  Peak Airway Pressure: 14 cmH20 (05/25/24 2032)  Total Ve: 11.7 L/m (05/25/24 2032)  F/VT Ratio<105 (RSBI): (!) 30.02 (05/25/24 1831)    Significant Imaging:  I have reviewed the pertinent imaging within the past 24  hours.        Assessment/Plan:     Assessment  CVA with left MCA occlusion s/p emergent mechanical thrombectomy and carotid angioplasty   HLD, HTN  Cerebral edema with impending herniation now status post decompressive craniectomy  4.   Oropharyngeal dysphagia secondary to neurologic dysfunction    Plan  Continue Cleviprex for now.  We will continue to monitor his respiratory status closely.  Patient will need long-term enteral access for feeds and meds therefore GI is being consulted for PEG.  Continue hourly neuro checks.    31 minutes of critical care was time spent personally by me on the following activities: development of treatment plan with patient or surrogate and bedside caregivers, discussions with consultants, evaluation of patient's response to treatment, examination of patient, ordering and performing treatments and interventions, ordering and review of laboratory studies, ordering and review of radiographic studies, pulse oximetry, re-evaluation of patient's condition.  This critical care time did not overlap with that of any other provider or involve time for any procedures.     Ricky Rodas MD  Pulmonary Critical Care Medicine  [facility]

## 2024-05-27 NOTE — PROGRESS NOTES
Awake, looks around. No speech. Follows with left side.  Drain in place. Head full but not tense.  Will follow.

## 2024-05-27 NOTE — PROGRESS NOTES
[facility] - 7th Floor ICU  Neurology  Progress Note    Patient Name: Robert Degroot  MRN: [id number]  Admission Date: 5/24/2024  Hospital Length of Stay: 3 days  Code Status: Full Code   Attending Provider: MILADY Rai MD  Primary Care Physician: Maria Del Rosario, Primary Doctor   Principal Problem:<principal problem not specified>      Subjective:     Interval History:   No new issues, neurologically unchanged.     Current Neurological Medications:     Current Facility-Administered Medications   Medication Dose Route Frequency Provider Last Rate Last Admin    0.9%  NaCl infusion   Intravenous Continuous LinseyRadha gaxiola NP 60 mL/hr at 05/27/24 1152 New Bag at 05/27/24 1152    acetaminophen suppository 650 mg  650 mg Rectal Q4H PRN Day, PANTERA Huston   650 mg at 05/27/24 1124    acetaminophen tablet 650 mg  650 mg Oral Q4H PRN Day, PANTERA [place]on        aspirin suppository 300 mg  300 mg Rectal Daily Radha Stiles NP   300 mg at 05/27/24 0804    atorvastatin tablet 80 mg  80 mg Oral Daily Susanna Strauss MD        bisacodyL suppository 10 mg  10 mg Rectal Daily PRN Radha Stiles NP        clevidipine (CLEVIPREX) 25 mg/50 mL infusion  0-32 mg/hr Intravenous Continuous Ricky Rodas MD 16 mL/hr at 05/27/24 0855 8 mg/hr at 05/27/24 0855    famotidine (PF) injection 20 mg  20 mg Intravenous Q12H Day, PANTERA [place]on   20 mg at 05/27/24 0804    hydrALAZINE injection 10 mg  10 mg Intravenous Q6H PRN Susanna Strauss MD   10 mg at 05/25/24 1655    HYDROcodone-acetaminophen 5-325 mg per tablet 1 tablet  1 tablet Oral Q4H PRN Day, PANTERA [place]uston        labetaloL (NORMODYNE,TRANDATE) 500 mg in 100 mL infusion (conc: 5 mg/mL)  0-3 mg/min Intravenous Continuous Dinh Pan MD   Stopped at 05/26/24 1157    levETIRAcetam (Keppra) 500 mg in dextrose 5 % in water (D5W) 100 mL IVPB (MB+)  500 mg Intravenous Q12H Gerald Quesada MD   Stopped at 05/27/24 0834    morphine injection 2 mg  2 mg  Intravenous Q4H PRN Day, PANETRA Huston   2 mg at 05/25/24 1719    mupirocin 2 % ointment   Nasal BID Gerald Quesada MD   Given at 05/27/24 0804    ondansetron disintegrating tablet 8 mg  8 mg Oral Q8H PRN Gerald Quesada MD        ondansetron injection 4 mg  4 mg Intravenous Q6H PRN Rizwana, PANTERA Huston        prochlorperazine injection Soln 5 mg  5 mg Intravenous Q6H PRN Day, PANTERA Huston        sodium chloride 0.9% flush 10 mL  10 mL Intravenous PRN Gerald Quesada MD        sodium chloride 0.9% flush 10 mL  10 mL Intravenous Q6H Gerald Quesada MD   10 mL at 05/27/24 0600    And    sodium chloride 0.9% flush 10 mL  10 mL Intravenous PRN Gerald Quesada MD         Facility-Administered Medications Ordered in Other Encounters   Medication Dose Route Frequency Provider Last Rate Last Admin    ceFAZolin injection   Intravenous PRN Sherman Beltran P, CRNA   2 g at 05/25/24 1426    electrolyte-A infusion   Intravenous Continuous PRN Sherman Beltran P, CRNA   New Bag at 05/25/24 1404    fentaNYL injection   Intravenous PRN [place], Sherman P, CRNA   100 mcg at 05/25/24 1408    phenylephrine HCl in 0.9% NaCl 1 mg/10 mL (100 mcg/mL) syringe   Intravenous PRN [place], Sherman P, CRNA   100 mcg at 05/25/24 1413    rocuronium injection   Intravenous PRN [place], Sherman P, CRNA   100 mg at 05/25/24 1408       Review of Systems  Objective:     Vital Signs (Most Recent):  Temp: 100.1 °F (37.8 °C) (05/27/24 1124)  Pulse: 91 (05/27/24 1015)  Resp: (!) 22 (05/27/24 1015)  BP: (!) 152/67 (05/27/24 1015)  SpO2: 98 % (05/27/24 1015) Vital Signs (24h Range):  Temp:  [98.8 °F (37.1 °C)-100.1 °F (37.8 °C)] 100.1 °F (37.8 °C)  Pulse:  [] 91  Resp:  [12-27] 22  SpO2:  [93 %-100 %] 98 %  BP: (113-167)/(53-99) 152/67  Arterial Line BP: (127-221)/() 142/50     Weight: 84.9 kg (187 lb 2.7 oz)  Body mass index is 26.86 kg/m².     Physical Exam      Vitals and nursing note reviewed.   HENT:      Head:      Comments: Crani incision site with  dressing, clean dry intact     Eyes:      Pupils: Pupils are equal, round, and reactive to light.   Pulmonary:      Effort: Accessory muscle usage present.   Musculoskeletal:      Right lower leg: No edema.      Left lower leg: No edema.   Skin:     General: Skin is warm and dry.      Capillary Refill: Capillary refill takes less than 2 seconds.   Neurological:      Mental Status: He is lethargic.      Comments:      Limited PE  Does not participate in exam  Global aphasia   Appears to move LUE purposeful, has a mitten on but does not follow commands  Appears to move LLE spontaneously on bed  Withdraws from painful stim RUE, RLE  Left sided gaze, does not cross midline to look to the right          NEUROLOGICAL EXAMINATION:      CRANIAL NERVES      CN III, IV, VI   Pupils are equal, round, and reactive to light.    Significant Labs:   Recent Lab Results  (Last 5 results in the past 24 hours)        05/27/24  0615   05/27/24  0559   05/27/24  0557   05/27/24  0556   05/27/24  0333        Albumin/Globulin Ratio         0.9       Albumin         2.7       ALP         44       Allens Test       N/A         ALT         20       Anion Gap         9.0       AST         26       Baso #         0.03       Basophil %         0.3       BILIRUBIN TOTAL         0.4       BUN         11.1       BUN/CREAT RATIO         14       Calcium         8.4       Calcium Level Ionized       1.23         Chloride         127  Comment: Critical Result called and verified by verbal readback to: Navi Newby on 05/27/2024 at 04:09. Reported by [id number].       CO2         21       Creatinine         0.80       Drawn by       sd rrt         eGFR         >60       Eos #         0.00       Eos %         0.0       Globulin, Total         2.9       Glucose         146       Hematocrit         32.1       Hemoglobin         10.8       Immature Grans (Abs)         0.04       Immature Granulocytes         0.3       Lymph #         0.73       LYMPH %          6.4       MCH         31.7       MCHC         33.6       MCV         94.1       Mono #         0.97       Mono %         8.5       MPV         10.2       Neut #         9.69       Neut %         84.5       nRBC         0.0       O2 Hb, Blood Gas       97.0         Osmolality     325           Oxygen Device, Blood gas       Non Rebreather         Platelet Count         174       Base Excess, Blood gas       1.70         CO Hgb       0.8         POC Glucose 135               POC HCO3       26.6         Met Hgb       0.1         POC PCO2       42.0         POC PH       7.410         POC PO2       92.0         Potassium, Blood Gas       3.1         Sodium, Blood Gas       153         POCT Glucose   135             Potassium         3.2       PROTEIN TOTAL         5.6       RBC         3.41       RDW         14.6       Sample site       Arterial Line         Sample Type       Arterial Blood         sO2, Blood gas       97.2         Sodium     157     157       TOC2, Blood gas       27.9         THb, Blood gas       10.9         WBC         11.46                              Significant Imaging: I have reviewed all pertinent imaging results/findings within the past 24 hours.  Assessment and Plan:     Acute cerebrovascular accident (CVA) due to occlusion of left middle cerebral artery  -63 M with left MCA occlusion transferred from OSH for intervention. THOMAS 1700. Not a candidate for TNK.   -s/p hemicrani on 5/25       Patient underwent mechanical thrombectomy and carotid angioplasty on 5/24. No stent was deployed due to risk of hemorrhagic transformation.      -CT head:   Loss of gray-white matter differentiation in the left MCA vascular territory as detailed above, concordant with acute ischemic infarct.  Subtle foci of hyperattenuation are noted within the left basal ganglia, left insular ribbon, and scattered in the left frontoparietal cortex which is indeterminate and could represent contrast staining versus petechial  hemorrhage.  No evidence of gross hemorrhagic transformation.     Trace left-to-right midline shift.      Plan:  - q1h neurochecks    - will d/c 3% drip  - start NS @ 60 mL/hr   - continue aspirin 300 mg rectal    - -160  - continue therapy efforts   - will need PEG tube, GI consulted  - could possibly need left ICA stent in the future depending on progress over the next couple of days      Further recommendations to follow from MD    VTE Risk Mitigation (From admission, onward)           Ordered     Reason for No Pharmacological VTE Prophylaxis  Once        Question:  Reasons:  Answer:  Risk of Bleeding    05/24/24 1140     IP VTE HIGH RISK PATIENT  Once         05/24/24 1140     Place sequential compression device  Until discontinued         05/24/24 1140                    Radha Stiles NP  Neurology  [facility] - 7th Floor ICU

## 2024-05-27 NOTE — PLAN OF CARE
05/27/24 1102   Discharge Assessment   Assessment Type Discharge Planning Assessment   Confirmed/corrected address, phone number and insurance Yes   Confirmed Demographics Correct on Facesheet   Source of Information family   Communicated HENRY with patient/caregiver Date not available/Unable to determine   Reason For Admission CVA   People in Home spouse   Facility Arrived From: none   Do you expect to return to your current living situation? Other (see comments)  (to be determined)   Do you have help at home or someone to help you manage your care at home? Yes   Who are your caregiver(s) and their phone number(s)? wife Tegan Degroot [phone]   Prior to hospitilization cognitive status: Alert/Oriented   Current cognitive status: Coma/Sedated/Intubated   Walking or Climbing Stairs Difficulty no   Dressing/Bathing Difficulty no   Equipment Currently Used at Home none   Patient currently being followed by outpatient case management? No   Do you currently have service(s) that help you manage your care at home? No   Who is going to help you get home at discharge? family   How do you get to doctors appointments? car, drives self   Are you on dialysis? No   Do you take coumadin? No   Discharge Plan A Other  (to be determined)   DME Needed Upon Discharge  other (see comments)  (to be determined)   Transition of Care Barriers None   OTHER   Name(s) of People in Home wifeTegan     Patient lives with his wife and was working prior to hospitalization, independent

## 2024-05-27 NOTE — PT/OT/SLP PROGRESS
SLP attempting clinical swallowing evaluation, however pt lethargic and difficult to arouse. SLP to continue to follow and treat as appropriate.

## 2024-05-27 NOTE — PT/OT/SLP EVAL
Physical Therapy Evaluation    Patient Name:  Robert Degroot   MRN:  [id number]    Recommendations:     Discharge therapy intensity: Moderate Intensity Therapy   Discharge Equipment Recommendations: to be determined by next level of care   Barriers to discharge: Impaired mobility and Ongoing medical needs    Assessment:     Robert Degroot is a 63 y.o. 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 .  He presents with the following impairments/functional limitations: weakness, impaired self care skills, impaired functional mobility, impaired balance, impaired cognition, decreased upper extremity function, decreased lower extremity function, decreased safety awareness.     Pt is unable to follow any formal commands again today. Pt was able to spontaneously move LUE and BLE. R UE flaccidity and R LE weakness are apparent. Pt's helmet has not been delivered again, so was unable to mobilize pt to EOB. Notified RN to contact  for helmet delivery. Pt performed two long sits in bed. Pt demonstrated some head control with tactile cues. Total A x2 for all mobility. Will continue to monitor for progress acutely.     Rehab Prognosis: Fair; patient would benefit from acute skilled PT services to address these deficits and reach maximum level of function.    Recent Surgery: * No procedures listed * 3 Days Post-Op    Plan:     During this hospitalization, patient would benefit from acute PT services 3 x/week to address the identified rehab impairments via gait training, therapeutic activities, therapeutic exercises, neuromuscular re-education and progress toward the following goals:    Plan of Care Expires:   06/27/24    Subjective     Chief Complaint: JESUS  Patient/Family Comments/goals: JESUS  Pain/Comfort:  Pain Rating 1: 0/10    Patients cultural, spiritual, Christianity conflicts given the current situation: no    Living Environment:  Pt unable to provide any info. Per nurse he lives with family and was ambulatory at baseline.  Prior to admission, patients level of function was ambulatory.  Equipment used at home: none.  DME owned (not currently used): unknown.  Upon discharge, patient will have assistance from family?.    Objective:     Communicated with nurse prior to session.  Patient found right sidelying with arterial line, blood pressure cuff, de jesus catheter, ROSELIA drain, peripheral IV, oxygen, pulse ox (continuous), SCD, pressure relief boots, telemetry  upon PT entry to room.    General Precautions: Standard, aspiration, aphasia, NPO (Helmet with OOB mobility; -160)  Orthopedic Precautions:N/A   Braces:    Respiratory Status:  oxymask 10L/minO2  Blood Pressure: 138/67, 97%, 91 HR    149/98 during mobility in bed. 96%      Exams:  RLE ROM: WNL  RLE Strength: Deficits: 2/5 grossly  LLE ROM: WNL  LLE Strength: Deficits: 3/5 grossly  Skin integrity: Visible skin intact      Functional Mobility:  Bed Mobility:     Rolling Right: total assistance and of 2 persons  Scooting: total assistance and of 2 persons  Supine to Sit: total assistance and of 2 persons  Sit to Supine: total assistance and of 2 persons      AM-PAC 6 CLICK MOBILITY  Total Score:7       Treatment & Education:  Performed PROM to all extremities.    Patient provided with verbal education education regarding PT role/goals/POC, fall prevention, safety awareness, and pressure ulcer prevention.  Additional teaching is warranted.     Patient left right sidelying with all lines intact, call button in reach, wedge under L side, pressure relief boots, and nurse notified.    GOALS:   Multidisciplinary Problems       Physical Therapy Goals          Problem:  Physical Therapy    Goal Priority Disciplines Outcome Goal Variances Interventions   Physical Therapy Goal     PT, PT/OT Progressing     Description: Goals to be met by: 2024    Patient will increase functional independence with mobility by performin. Supine to sit with MInimal Assistance  2. Sit to stand transfer with Minimal Assistance  3. Gait  x 10 feet with Moderate Assistance using LRAD.   4. Sitting at edge of bed x5 minutes with Stand-by Assistance                         History:     No past medical history on file.    No past surgical history on file.    Time Tracking:     PT Received On: 24  PT Start Time: 954     PT Stop Time: 1010  PT Total Time (min): 16 min     Billable Minutes: Evaluation 16      2024

## 2024-05-27 NOTE — CONSULTS
Patient Name: Robert Degroot   MRN: [id number]   Admission Date: 5/24/2024   Hospital Length of Stay: 3   Attending Provider: MILADY Rai MD   Consulting Provider: Miguel Lanier M.D.  Reason for Consult: Goals of Care  Primary Care Physician: No, Primary Doctor     Principal Problem: <principal problem not specified>     Patient information was obtained from spouse/SO, relative(s), and ER records.      Final diagnoses:  [I63.9] Stroke     Assessment/Plan:     We reviewed the patient and family's understanding of the seriousness of the illness and its expected prognosis. We discussed the patient's goals of care and treatment preferences. We discussed the difference between palliative and curative medicine. We clarified current code status. We identified the surrogate decision maker or health care POA (No POA). We discussed the patient's chosen code status as listed above and the contents of the [place]. We answered all questions and we formulated a plan including recommendations for symptom management and how to best achieve goals of care.     We reviewed the patient's current clinical status with the nurse. We reviewed clinical documentation, labs and imaging.       Advance Care Planning     Date: 05/27/2024    Code Status  In light of the patients advanced and life limiting illness,I engaged the the family in a voluntary conversation about the patient's preferences for care  at the very end of life. The patient wishes to have a natural, peaceful death.  Along those lines, the patient does not wish to have CPR or other invasive treatments performed when his heart and/or breathing stops. I communicated to the family that a DNR order would be placed in his medical record to reflect this preference.        [facility]  I engaged the family in a voluntary conversation about advance care planning and we specifically addressed what the goals of care would be moving forward, in light of the patient's change in clinical  "status, specifically acute CVA with left MCA occlusion s/p mechanical thrombectomy and carotid angioplasty. He had subsequent worsening cerebral edema and midline shift and required decompressive craniotomy. He was intubated but self-extubated. He is now on nonrebreather and hasn't been able to tolerate transition to oxymask. He pulled out ET tube and so requires mittens. He has a leftward gaze but does not track with eyes or follow commands. He moves LUE spontaneously.  He is not able to pass a swallow test.     We met with his wife, Tegan, sister, Lluvia and 2 sons in a family meeting. We reviewed his future treatment goals. His son, Dorian and wife were very expressive and stated that the patient "would not want to be kept alive in his current situation." However, regarding nutrition, they are mostly in favor of PEG placement. We reviewed the benefits and risks. I pointed out the risk that the patient may pull out the tube (as he will likely live in a nursing home, should he not improve neurologically). I explained NH policy regarding restraints (including mittens). The patient would be brought back the ER if the tube is self-removed. They are worried that not feeding him would be the same as starving him. I agreed that it is a reasonable option to provide nutrition to some one who is helpless otherwise to eat/ drink safely.   Family will decide finally after further discussion. I suspect PEG will be elected.     We talked about the risks of carotid stent placement, to require intubation/ mechanical ventilation. I noted that Dr. Rodas feels that he will probably be able to extubate safely. I reviewed family's wishes should he fail to extubated.  I discussed tracheostomy. Family agreed that they would not elect this if needed. They said, "he would not wish to remain on a ventilator long term."     We explored the patient's values and preferences for future care.  The family endorses that what is most " important right now is to focus on remaining as independent as possible, improvement in condition but with limits to invasive therapies, and comfort and QOL     Accordingly, we have decided that the best plan to meet the patient's goals includes continuing with treatment and To be determined. Family hopes that he may improve going forward. However, they understand that if he is unable to communicate or follow commands, it may not be a reasonable plan to consider a rehab setting. We discussed the option of hospice in a nursing home.    Hospice review:     I reviewed the benefits of home hospice care, including aggressive symptom-based management with the intent to avoid future hospitalizations which may increase increase the risk of having a negative effect on her quality of life. I reviewed the benefit of regular visits by an assigned RN and 24/7 on call RN to address uncontrolled symptoms which may precipitate into a symptom crisis. This may prevent unwanted ER visits or hospitalizations ordinarily necessary to manage symptoms of an advanced illness. I also reviewed benefit of regular CNA visits and the option of  and  visitations. I explained that all medications related to her diagnosis and symptom related medications would be covered by hospice, including oxygen, a hospital bed and other durable medical equipment.      I did explain the role for hospice care at this stage of the patient's illness, including its ability to help the patient live with the best quality of life possible.  We will be making a hospice referral once patient has completed further treatments already discussed.    Disposition: To be determined      History of Present Illness:     62 y/o M h/o acute CVA with right agnosia, hemiplegia, leftward gaze, aphasia, s/p thrombectomy, carotid angioplasty and hemicraniectomy. We were consulted to review goals of care with family.      Active Ambulatory Problems     Diagnosis Date  Noted    No Active Ambulatory Problems     Resolved Ambulatory Problems     Diagnosis Date Noted    No Resolved Ambulatory Problems     No Additional Past Medical History        No past surgical history on file.     Review of patient's allergies indicates:  No Known Allergies       Current Facility-Administered Medications:     0.9%  NaCl infusion, , Intravenous, Continuous, Radha Stiles NP, Last Rate: 60 mL/hr at 05/27/24 1152, New Bag at 05/27/24 1152    acetaminophen suppository 650 mg, 650 mg, Rectal, Q4H PRN, Robel Wagoner PA, 650 mg at 05/27/24 1124    acetaminophen tablet 650 mg, 650 mg, Oral, Q4H PRN, Day, PANTERA Huston    aspirin suppository 300 mg, 300 mg, Rectal, Daily, Radha Stiles NP, 300 mg at 05/27/24 0804    atorvastatin tablet 80 mg, 80 mg, Oral, Daily, Susanna Strauss MD    bisacodyL suppository 10 mg, 10 mg, Rectal, Daily PRN, Radha Stiles NP    clevidipine (CLEVIPREX) 25 mg/50 mL infusion, 0-32 mg/hr, Intravenous, Continuous, Ricky Rodas MD, Last Rate: 24 mL/hr at 05/27/24 1428, 12 mg/hr at 05/27/24 1428    famotidine (PF) injection 20 mg, 20 mg, Intravenous, Q12H, Robel Wagoner PA, 20 mg at 05/27/24 0804    hydrALAZINE injection 10 mg, 10 mg, Intravenous, Q6H PRN, Susanna Strauss MD, 10 mg at 05/25/24 1655    HYDROcodone-acetaminophen 5-325 mg per tablet 1 tablet, 1 tablet, Oral, Q4H PRN, Robel Wagoner PA    labetaloL (NORMODYNE,TRANDATE) 500 mg in 100 mL infusion (conc: 5 mg/mL), 0-3 mg/min, Intravenous, Continuous, Dinh Pan MD, Stopped at 05/26/24 1157    levETIRAcetam (Keppra) 500 mg in dextrose 5 % in water (D5W) 100 mL IVPB (MB+), 500 mg, Intravenous, Q12H, Gerald Quesada MD, Stopped at 05/27/24 0834    morphine injection 2 mg, 2 mg, Intravenous, Q4H PRN, Day, PANTERA [place], 2 mg at 05/25/24 1719    mupirocin 2 % ointment, , Nasal, BID, Gerald Quesada MD, Given at 05/27/24 0804    ondansetron disintegrating tablet 8 mg, 8 mg, Oral, Q8H  "PRN, Gerald Quesada MD    ondansetron injection 4 mg, 4 mg, Intravenous, Q6H PRN, Day, PANTERA Huston    prochlorperazine injection Soln 5 mg, 5 mg, Intravenous, Q6H PRN, Day, PANTERA [place]on    sodium chloride 0.9% flush 10 mL, 10 mL, Intravenous, PRN, Gerald Quesada MD    Flushing PICC/Midline Protocol, , , Until Discontinued **AND** sodium chloride 0.9% flush 10 mL, 10 mL, Intravenous, Q6H, 10 mL at 05/27/24 0600 **AND** sodium chloride 0.9% flush 10 mL, 10 mL, Intravenous, PRN, Gerald Quesada MD    Facility-Administered Medications Ordered in Other Encounters:     ceFAZolin injection, , Intravenous, PRN, Sherman Beltran, CRNA, 2 g at 05/25/24 1426    electrolyte-A infusion, , Intravenous, Continuous PRN, Sherman Beltran, CRNA, New Bag at 05/25/24 1404    fentaNYL injection, , Intravenous, PRN, Sherman Beltran, CRNA, 100 mcg at 05/25/24 1408    phenylephrine HCl in 0.9% NaCl 1 mg/10 mL (100 mcg/mL) syringe, , Intravenous, PRN, Sherman Beltran, CRNA, 100 mcg at 05/25/24 1413    rocuronium injection, , Intravenous, PRN, Sherman Beltran P, CRNA, 100 mg at 05/25/24 1408       Current Facility-Administered Medications:     acetaminophen, 650 mg, Rectal, Q4H PRN    acetaminophen, 650 mg, Oral, Q4H PRN    bisacodyL, 10 mg, Rectal, Daily PRN    hydrALAZINE, 10 mg, Intravenous, Q6H PRN    HYDROcodone-acetaminophen, 1 tablet, Oral, Q4H PRN    morphine, 2 mg, Intravenous, Q4H PRN    ondansetron, 8 mg, Oral, Q8H PRN    ondansetron, 4 mg, Intravenous, Q6H PRN    prochlorperazine, 5 mg, Intravenous, Q6H PRN    sodium chloride 0.9%, 10 mL, Intravenous, PRN    Flushing PICC/Midline Protocol, , , Until Discontinued **AND** sodium chloride 0.9%, 10 mL, Intravenous, Q6H **AND** sodium chloride 0.9%, 10 mL, Intravenous, PRN     No family history on file.     Review of Systems   Unable to perform ROS: Mental status change            Objective:   BP (!) 165/70   Pulse 66   Temp 100.1 °F (37.8 °C)   Resp 15   Ht 5' 10" (1.778 m)   Wt " 84.9 kg (187 lb 2.7 oz)   SpO2 98%   BMI 26.86 kg/m²      Physical Exam  Vitals reviewed.   Constitutional:       Appearance: He is ill-appearing.   HENT:      Right Ear: External ear normal.      Left Ear: External ear normal.      Nose: Nose normal.      Mouth/Throat:      Mouth: Mucous membranes are moist.      Pharynx: Oropharynx is clear.   Eyes:      Conjunctiva/sclera: Conjunctivae normal.      Pupils: Pupils are equal, round, and reactive to light.   Cardiovascular:      Rate and Rhythm: Normal rate and regular rhythm.      Pulses: Normal pulses.      Heart sounds: Normal heart sounds.   Pulmonary:      Effort: Pulmonary effort is normal.      Breath sounds: Normal breath sounds.   Abdominal:      General: Abdomen is flat. Bowel sounds are normal. There is no distension.      Palpations: Abdomen is soft.      Tenderness: There is no abdominal tenderness.   Musculoskeletal:      Left lower leg: No edema.   Skin:     General: Skin is warm.   Neurological:      Mental Status: He is disoriented.            Review of Symptoms  Review of Symptoms      Symptom Assessment (ESAS 0-10 Scale)  Unable to complete assessment due to Mental status change     CAM / Delirium:  Positive      Pain Assessment in Advanced Demential Scale (PAINAD)   Breathing - Independent of vocalization:  0  Negative vocalization:  0  Facial expression:  0  Body language:  0  Consolability:  0  Total:  0    Performance Status:  20    Living Arrangements:  Lives in home and Lives with spouse    Psychosocial/Cultural:   See Palliative Psychosocial Note: No  **Primary  to Follow**  Palliative Care  Consult: No    Spiritual:  F - Stacie and Belief:  Sikhism  A - Address in Care:  Will consult      Time-Based Charting:  Yes    Total Time Spent: 0 minutes      Advance Care Planning   Advance Directives:   Do Not Resuscitate Status: Yes    Agent's Name:  Dominique   Agent's Contact Number:  [phone]    Decision  Making:  Family answered questions and Patient unable to communicate due to disease severity/cognitive impairment  Goals of Care: The family endorses that what is most important right now is to focus on remaining as independent as possible, quality of life, even if it means sacrificing a little time, improvement in condition but with limits to invasive therapies, and comfort and QOL     Accordingly, we have decided that the best plan to meet the patient's goals includes continuing with treatment            Caregiver burden formerly assessed: Yes Family states that if the patient does not improve, he could not live in the home as wife would not care for him.        > 50% of 70 min of encounter was spent in chart review, face to face discussion of goals of care, symptom assessment, coordination of care and emotional support.     An additional 18 min of time was spent in of voluntary discussion of advanced care planning.    Miguel Lanier M.D.  Palliative Medicine  [facility]

## 2024-05-27 NOTE — PLAN OF CARE
Problem: Physical Therapy  Goal: Physical Therapy Goal  Description: Goals to be met by: 2024    Patient will increase functional independence with mobility by performin. Supine to sit with MInimal Assistance  2. Sit to stand transfer with Minimal Assistance  3. Gait  x 10 feet with Moderate Assistance using LRAD.   4. Sitting at edge of bed x5 minutes with Stand-by Assistance    Outcome: Progressing

## 2024-05-27 NOTE — SUBJECTIVE & OBJECTIVE
Subjective:     Interval History:   No new issues, neurologically unchanged.     Current Neurological Medications:     Current Facility-Administered Medications   Medication Dose Route Frequency Provider Last Rate Last Admin    0.9%  NaCl infusion   Intravenous Continuous Radha Stiles NP 60 mL/hr at 05/27/24 1152 New Bag at 05/27/24 1152    acetaminophen suppository 650 mg  650 mg Rectal Q4H PRN Day, PANTERA Huston   650 mg at 05/27/24 1124    acetaminophen tablet 650 mg  650 mg Oral Q4H PRN Day, PANTERA Huston        aspirin suppository 300 mg  300 mg Rectal Daily Radha Stiles NP   300 mg at 05/27/24 0804    atorvastatin tablet 80 mg  80 mg Oral Daily Susanna Strauss MD        bisacodyL suppository 10 mg  10 mg Rectal Daily PRN Radha Stiles NP        clevidipine (CLEVIPREX) 25 mg/50 mL infusion  0-32 mg/hr Intravenous Continuous Ricky Rodas MD 16 mL/hr at 05/27/24 0855 8 mg/hr at 05/27/24 0855    famotidine (PF) injection 20 mg  20 mg Intravenous Q12H Day, PANTERA Huston   20 mg at 05/27/24 0804    hydrALAZINE injection 10 mg  10 mg Intravenous Q6H PRN Susanna Strauss MD   10 mg at 05/25/24 1655    HYDROcodone-acetaminophen 5-325 mg per tablet 1 tablet  1 tablet Oral Q4H PRN Day, PANTERA Huston        labetaloL (NORMODYNE,TRANDATE) 500 mg in 100 mL infusion (conc: 5 mg/mL)  0-3 mg/min Intravenous Continuous Dinh Pan MD   Stopped at 05/26/24 1157    levETIRAcetam (Keppra) 500 mg in dextrose 5 % in water (D5W) 100 mL IVPB (MB+)  500 mg Intravenous Q12H Gerald Quesada MD   Stopped at 05/27/24 0834    morphine injection 2 mg  2 mg Intravenous Q4H PRN Day, PANTERA Huston   2 mg at 05/25/24 1719    mupirocin 2 % ointment   Nasal BID Gerald Quesada MD   Given at 05/27/24 0804    ondansetron disintegrating tablet 8 mg  8 mg Oral Q8H PRN Gerald Quesada S, MD        ondansetron injection 4 mg  4 mg Intravenous Q6H PRN Day, PANTERA Huston        prochlorperazine injection Soln 5 mg   5 mg Intravenous Q6H PRN Robel Wagoner PA        sodium chloride 0.9% flush 10 mL  10 mL Intravenous PRN Gerald Quesada MD        sodium chloride 0.9% flush 10 mL  10 mL Intravenous Q6H Gerald Quesada MD   10 mL at 05/27/24 0600    And    sodium chloride 0.9% flush 10 mL  10 mL Intravenous PRN Gerald Quesada MD         Facility-Administered Medications Ordered in Other Encounters   Medication Dose Route Frequency Provider Last Rate Last Admin    ceFAZolin injection   Intravenous PRN [place], Sherman P, CRNA   2 g at 05/25/24 1426    electrolyte-A infusion   Intravenous Continuous PRN [place], Sherman P, CRNA   New Bag at 05/25/24 1404    fentaNYL injection   Intravenous PRN [place], Sherman P, CRNA   100 mcg at 05/25/24 1408    phenylephrine HCl in 0.9% NaCl 1 mg/10 mL (100 mcg/mL) syringe   Intravenous PRN [place], Sherman P, CRNA   100 mcg at 05/25/24 1413    rocuronium injection   Intravenous PRN [place], Sherman P, CRNA   100 mg at 05/25/24 1408       Review of Systems  Objective:     Vital Signs (Most Recent):  Temp: 100.1 °F (37.8 °C) (05/27/24 1124)  Pulse: 91 (05/27/24 1015)  Resp: (!) 22 (05/27/24 1015)  BP: (!) 152/67 (05/27/24 1015)  SpO2: 98 % (05/27/24 1015) Vital Signs (24h Range):  Temp:  [98.8 °F (37.1 °C)-100.1 °F (37.8 °C)] 100.1 °F (37.8 °C)  Pulse:  [] 91  Resp:  [12-27] 22  SpO2:  [93 %-100 %] 98 %  BP: (113-167)/(53-99) 152/67  Arterial Line BP: (127-221)/() 142/50     Weight: 84.9 kg (187 lb 2.7 oz)  Body mass index is 26.86 kg/m².     Physical Exam      Vitals and nursing note reviewed.   HENT:      Head:      Comments: Crani incision site with dressing, clean dry intact     Eyes:      Pupils: Pupils are equal, round, and reactive to light.   Pulmonary:      Effort: Accessory muscle usage present.   Musculoskeletal:      Right lower leg: No edema.      Left lower leg: No edema.   Skin:     General: Skin is warm and dry.      Capillary Refill: Capillary refill takes less than 2 seconds.    Neurological:      Mental Status: He is lethargic.      Comments:      Limited PE  Does not participate in exam  Global aphasia   Appears to move LUE purposeful, has a mitten on but does not follow commands  Appears to move LLE spontaneously on bed  Withdraws from painful stim RUE, RLE  Left sided gaze, does not cross midline to look to the right          NEUROLOGICAL EXAMINATION:      CRANIAL NERVES      CN III, IV, VI   Pupils are equal, round, and reactive to light.    Significant Labs:   Recent Lab Results  (Last 5 results in the past 24 hours)        05/27/24  0615   05/27/24  0559   05/27/24  0557   05/27/24  0556   05/27/24  0333        Albumin/Globulin Ratio         0.9       Albumin         2.7       ALP         44       Allens Test       N/A         ALT         20       Anion Gap         9.0       AST         26       Baso #         0.03       Basophil %         0.3       BILIRUBIN TOTAL         0.4       BUN         11.1       BUN/CREAT RATIO         14       Calcium         8.4       Calcium Level Ionized       1.23         Chloride         127  Comment: Critical Result called and verified by verbal readback to: Navi Newby on 05/27/2024 at 04:09. Reported by [id number].       CO2         21       Creatinine         0.80       Drawn by       sd rrt         eGFR         >60       Eos #         0.00       Eos %         0.0       Globulin, Total         2.9       Glucose         146       Hematocrit         32.1       Hemoglobin         10.8       Immature Grans (Abs)         0.04       Immature Granulocytes         0.3       Lymph #         0.73       LYMPH %         6.4       MCH         31.7       MCHC         33.6       MCV         94.1       Mono #         0.97       Mono %         8.5       MPV         10.2       Neut #         9.69       Neut %         84.5       nRBC         0.0       O2 Hb, Blood Gas       97.0         Osmolality     325           Oxygen Device, Blood gas       Non Rebreather          Platelet Count         174       Base Excess, Blood gas       1.70         CO Hgb       0.8         POC Glucose 135               POC HCO3       26.6         Met Hgb       0.1         POC PCO2       42.0         POC PH       7.410         POC PO2       92.0         Potassium, Blood Gas       3.1         Sodium, Blood Gas       153         POCT Glucose   135             Potassium         3.2       PROTEIN TOTAL         5.6       RBC         3.41       RDW         14.6       Sample site       Arterial Line         Sample Type       Arterial Blood         sO2, Blood gas       97.2         Sodium     157     157       TOC2, Blood gas       27.9         THb, Blood gas       10.9         WBC         11.46                              Significant Imaging: I have reviewed all pertinent imaging results/findings within the past 24 hours.

## 2024-05-27 NOTE — ASSESSMENT & PLAN NOTE
-63 M with left MCA occlusion transferred from OSH for intervention. LKN 1700. Not a candidate for TNK.        Patient underwent mechanical thrombectomy and carotid angioplasty on 5/24. No stent was deployed due to risk of hemorrhagic transformation.      -CT head:   Loss of gray-white matter differentiation in the left MCA vascular territory as detailed above, concordant with acute ischemic infarct.  Subtle foci of hyperattenuation are noted within the left basal ganglia, left insular ribbon, and scattered in the left frontoparietal cortex which is indeterminate and could represent contrast staining versus petechial hemorrhage.  No evidence of gross hemorrhagic transformation.     Trace left-to-right midline shift.      Plan:  - q1h neurochecks    - s/p crani   - will d/c 3% drip  - start NS @ 60 mL/hr   - continue aspirin 300 mg rectal    - -160  - continue therapy efforts   - will need PEG tube, GI consulted  - could possibly need left ICA stent in the future depending on progress over the next couple of days

## 2024-05-27 NOTE — NURSING
Nurses Note -- 4 Eyes      5/27/2024   2:45 AM      Skin assessed during: Daily Assessment      [] No Altered Skin Integrity Present    [x]Prevention Measures Documented      [x] Yes- Altered Skin Integrity Present or Discovered   [] LDA Added if Not in Epic (Describe Wound)   [] New Altered Skin Integrity was Present on Admit and Documented in LDA   [] Wound Image Taken    Wound Care Consulted? No    Attending Nurse:  Margaret Aguiar RN/Staff Member:   MANFRED Noble

## 2024-05-27 NOTE — NURSING
Nurses Note -- 4 Eyes      5/27/2024   7:50 AM      Skin assessed during: Daily Assessment      [] No Altered Skin Integrity Present    [x]Prevention Measures Documented      [x] Yes- Altered Skin Integrity Present or Discovered   [] LDA Added if Not in Epic (Describe Wound)   [] New Altered Skin Integrity was Present on Admit and Documented in LDA   [] Wound Image Taken    Wound Care Consulted? No    Attending Nurse:  Kev Aguiar RN/Staff Member:  MANFRED Baltazar

## 2024-05-28 LAB
ALBUMIN SERPL-MCNC: 2.5 G/DL (ref 3.4–4.8)
ALBUMIN/GLOB SERPL: 0.6 RATIO (ref 1.1–2)
ALP SERPL-CCNC: 63 UNIT/L (ref 40–150)
ALT SERPL-CCNC: 35 UNIT/L (ref 0–55)
ANION GAP SERPL CALC-SCNC: 7 MEQ/L
AST SERPL-CCNC: 27 UNIT/L (ref 5–34)
BASOPHILS # BLD AUTO: 0.04 X10(3)/MCL
BASOPHILS NFR BLD AUTO: 0.3 %
BILIRUB SERPL-MCNC: 0.4 MG/DL
BUN SERPL-MCNC: 13.4 MG/DL (ref 8.4–25.7)
CALCIUM SERPL-MCNC: 9.1 MG/DL (ref 8.8–10)
CHLORIDE SERPL-SCNC: 122 MMOL/L (ref 98–107)
CO2 SERPL-SCNC: 25 MMOL/L (ref 23–31)
CREAT SERPL-MCNC: 0.84 MG/DL (ref 0.73–1.18)
CREAT/UREA NIT SERPL: 16
EOSINOPHIL # BLD AUTO: 0.02 X10(3)/MCL (ref 0–0.9)
EOSINOPHIL NFR BLD AUTO: 0.2 %
ERYTHROCYTE [DISTWIDTH] IN BLOOD BY AUTOMATED COUNT: 14.6 % (ref 11.5–17)
GFR SERPLBLD CREATININE-BSD FMLA CKD-EPI: >60 ML/MIN/1.73/M2
GLOBULIN SER-MCNC: 4.4 GM/DL (ref 2.4–3.5)
GLUCOSE SERPL-MCNC: 168 MG/DL (ref 82–115)
HCT VFR BLD AUTO: 34.9 % (ref 42–52)
HGB BLD-MCNC: 11.7 G/DL (ref 14–18)
IMM GRANULOCYTES # BLD AUTO: 0.07 X10(3)/MCL (ref 0–0.04)
IMM GRANULOCYTES NFR BLD AUTO: 0.5 %
LEFT CCA DIST DIAS: 2 CM/S
LEFT CCA DIST SYS: 157 CM/S
LEFT CCA PROX DIAS: 9 CM/S
LEFT CCA PROX SYS: 162 CM/S
LEFT ECA DIAS: 31 CM/S
LEFT ECA SYS: 418 CM/S
LEFT ICA DIST DIAS: 7 CM/S
LEFT ICA DIST SYS: 81 CM/S
LEFT ICA MID DIAS: 16 CM/S
LEFT ICA MID SYS: 89 CM/S
LEFT ICA PROX DIAS: 15 CM/S
LEFT ICA PROX SYS: 235 CM/S
LYMPHOCYTES # BLD AUTO: 1.04 X10(3)/MCL (ref 0.6–4.6)
LYMPHOCYTES NFR BLD AUTO: 8 %
MCH RBC QN AUTO: 31.6 PG (ref 27–31)
MCHC RBC AUTO-ENTMCNC: 33.5 G/DL (ref 33–36)
MCV RBC AUTO: 94.3 FL (ref 80–94)
MONOCYTES # BLD AUTO: 0.92 X10(3)/MCL (ref 0.1–1.3)
MONOCYTES NFR BLD AUTO: 7.1 %
NEUTROPHILS # BLD AUTO: 10.88 X10(3)/MCL (ref 2.1–9.2)
NEUTROPHILS NFR BLD AUTO: 83.9 %
NRBC BLD AUTO-RTO: 0 %
OHS CV CAROTID RIGHT ICA EDV HIGHEST: 27
OHS CV CAROTID ULTRASOUND LEFT ICA/CCA RATIO: 1.5
OHS CV CAROTID ULTRASOUND RIGHT ICA/CCA RATIO: 1.63
OHS CV PV CAROTID LEFT HIGHEST CCA: 162
OHS CV PV CAROTID LEFT HIGHEST ICA: 235
OHS CV PV CAROTID RIGHT HIGHEST CCA: 167
OHS CV PV CAROTID RIGHT HIGHEST ICA: 209
OHS CV US CAROTID LEFT HIGHEST EDV: 16
OHS QRS DURATION: 82 MS
OHS QTC CALCULATION: 489 MS
OSMOLALITY SERPL: 323 MOSM/KG (ref 280–300)
OSMOLALITY SERPL: 325 MOSM/KG (ref 280–300)
OSMOLALITY SERPL: 326 MOSM/KG (ref 280–300)
PLATELET # BLD AUTO: 193 X10(3)/MCL (ref 130–400)
PMV BLD AUTO: 10.8 FL (ref 7.4–10.4)
POCT GLUCOSE: 128 MG/DL (ref 70–110)
POCT GLUCOSE: 94 MG/DL (ref 70–110)
POTASSIUM SERPL-SCNC: 3.3 MMOL/L (ref 3.5–5.1)
PROT SERPL-MCNC: 6.9 GM/DL (ref 5.8–7.6)
RBC # BLD AUTO: 3.7 X10(6)/MCL (ref 4.7–6.1)
RIGHT CCA DIST DIAS: 8 CM/S
RIGHT CCA DIST SYS: 128 CM/S
RIGHT CCA PROX DIAS: 22 CM/S
RIGHT CCA PROX SYS: 167 CM/S
RIGHT ECA DIAS: 20 CM/S
RIGHT ECA SYS: 203 CM/S
RIGHT ICA DIST DIAS: 27 CM/S
RIGHT ICA DIST SYS: 207 CM/S
RIGHT ICA MID DIAS: 26 CM/S
RIGHT ICA MID SYS: 209 CM/S
RIGHT ICA PROX DIAS: 26 CM/S
RIGHT ICA PROX SYS: 171 CM/S
RIGHT VERTEBRAL DIAS: 12 CM/S
RIGHT VERTEBRAL SYS: 70 CM/S
SODIUM SERPL-SCNC: 154 MMOL/L (ref 136–145)
SODIUM SERPL-SCNC: 158 MMOL/L (ref 136–145)
WBC # SPEC AUTO: 12.97 X10(3)/MCL (ref 4.5–11.5)

## 2024-05-28 PROCEDURE — 80053 COMPREHEN METABOLIC PANEL: CPT

## 2024-05-28 PROCEDURE — 25000003 PHARM REV CODE 250

## 2024-05-28 PROCEDURE — 27000221 HC OXYGEN, UP TO 24 HOURS

## 2024-05-28 PROCEDURE — A4216 STERILE WATER/SALINE, 10 ML: HCPCS | Performed by: PSYCHIATRY & NEUROLOGY

## 2024-05-28 PROCEDURE — 63600175 PHARM REV CODE 636 W HCPCS: Performed by: PSYCHIATRY & NEUROLOGY

## 2024-05-28 PROCEDURE — 83930 ASSAY OF BLOOD OSMOLALITY: CPT | Performed by: PSYCHIATRY & NEUROLOGY

## 2024-05-28 PROCEDURE — 84295 ASSAY OF SERUM SODIUM: CPT | Performed by: PSYCHIATRY & NEUROLOGY

## 2024-05-28 PROCEDURE — 36415 COLL VENOUS BLD VENIPUNCTURE: CPT

## 2024-05-28 PROCEDURE — 63600175 PHARM REV CODE 636 W HCPCS: Performed by: INTERNAL MEDICINE

## 2024-05-28 PROCEDURE — 85025 COMPLETE CBC W/AUTO DIFF WBC: CPT

## 2024-05-28 PROCEDURE — 25000003 PHARM REV CODE 250: Performed by: NURSE PRACTITIONER

## 2024-05-28 PROCEDURE — 63600175 PHARM REV CODE 636 W HCPCS

## 2024-05-28 PROCEDURE — 97530 THERAPEUTIC ACTIVITIES: CPT

## 2024-05-28 PROCEDURE — 25000003 PHARM REV CODE 250: Performed by: PSYCHIATRY & NEUROLOGY

## 2024-05-28 PROCEDURE — 99232 SBSQ HOSP IP/OBS MODERATE 35: CPT | Mod: ,,, | Performed by: INTERNAL MEDICINE

## 2024-05-28 PROCEDURE — 20000000 HC ICU ROOM

## 2024-05-28 PROCEDURE — C9248 INJ, CLEVIDIPINE BUTYRATE: HCPCS | Performed by: INTERNAL MEDICINE

## 2024-05-28 PROCEDURE — 36415 COLL VENOUS BLD VENIPUNCTURE: CPT | Performed by: PSYCHIATRY & NEUROLOGY

## 2024-05-28 PROCEDURE — 25000003 PHARM REV CODE 250: Performed by: STUDENT IN AN ORGANIZED HEALTH CARE EDUCATION/TRAINING PROGRAM

## 2024-05-28 RX ORDER — HYDRALAZINE HYDROCHLORIDE 20 MG/ML
10 INJECTION INTRAMUSCULAR; INTRAVENOUS EVERY 4 HOURS PRN
Status: DISCONTINUED | OUTPATIENT
Start: 2024-05-28 | End: 2024-05-29 | Stop reason: HOSPADM

## 2024-05-28 RX ORDER — METOPROLOL TARTRATE 25 MG/1
25 TABLET, FILM COATED ORAL 2 TIMES DAILY
Status: DISCONTINUED | OUTPATIENT
Start: 2024-05-28 | End: 2024-05-29 | Stop reason: HOSPADM

## 2024-05-28 RX ORDER — LABETALOL HYDROCHLORIDE 5 MG/ML
10 INJECTION, SOLUTION INTRAVENOUS EVERY 4 HOURS PRN
Status: DISCONTINUED | OUTPATIENT
Start: 2024-05-28 | End: 2024-05-29 | Stop reason: HOSPADM

## 2024-05-28 RX ADMIN — SODIUM CHLORIDE, PRESERVATIVE FREE 10 ML: 5 INJECTION INTRAVENOUS at 05:05

## 2024-05-28 RX ADMIN — FAMOTIDINE 20 MG: 10 INJECTION, SOLUTION INTRAVENOUS at 08:05

## 2024-05-28 RX ADMIN — CLEVIPIDINE 12 MG/HR: 0.5 EMULSION INTRAVENOUS at 06:05

## 2024-05-28 RX ADMIN — SODIUM CHLORIDE, PRESERVATIVE FREE 10 ML: 5 INJECTION INTRAVENOUS at 11:05

## 2024-05-28 RX ADMIN — AMIODARONE HYDROCHLORIDE 1 MG/MIN: 1.8 INJECTION, SOLUTION INTRAVENOUS at 12:05

## 2024-05-28 RX ADMIN — MUPIROCIN: 20 OINTMENT TOPICAL at 08:05

## 2024-05-28 RX ADMIN — LEVETIRACETAM 500 MG: 100 INJECTION, SOLUTION INTRAVENOUS at 08:05

## 2024-05-28 RX ADMIN — MORPHINE SULFATE 2 MG: 4 INJECTION INTRAVENOUS at 08:05

## 2024-05-28 RX ADMIN — MUPIROCIN: 20 OINTMENT TOPICAL at 09:05

## 2024-05-28 RX ADMIN — DILTIAZEM HYDROCHLORIDE 5 MG: 5 INJECTION INTRAVENOUS at 02:05

## 2024-05-28 RX ADMIN — CLEVIPIDINE 14 MG/HR: 0.5 EMULSION INTRAVENOUS at 12:05

## 2024-05-28 RX ADMIN — CLEVIPIDINE 12 MG/HR: 0.5 EMULSION INTRAVENOUS at 10:05

## 2024-05-28 RX ADMIN — SODIUM CHLORIDE, PRESERVATIVE FREE 10 ML: 5 INJECTION INTRAVENOUS at 12:05

## 2024-05-28 RX ADMIN — METOPROLOL TARTRATE 5 MG: 1 INJECTION, SOLUTION INTRAVENOUS at 02:05

## 2024-05-28 RX ADMIN — CLEVIPIDINE 16 MG/HR: 0.5 EMULSION INTRAVENOUS at 05:05

## 2024-05-28 RX ADMIN — ASPIRIN 300 MG: 300 SUPPOSITORY RECTAL at 08:05

## 2024-05-28 RX ADMIN — AMIODARONE HYDROCHLORIDE 1 MG/MIN: 1.8 INJECTION, SOLUTION INTRAVENOUS at 03:05

## 2024-05-28 RX ADMIN — AMIODARONE HYDROCHLORIDE 0.5 MG/MIN: 1.8 INJECTION, SOLUTION INTRAVENOUS at 10:05

## 2024-05-28 RX ADMIN — CLEVIPIDINE 14 MG/HR: 0.5 EMULSION INTRAVENOUS at 07:05

## 2024-05-28 RX ADMIN — CLEVIPIDINE 14 MG/HR: 0.5 EMULSION INTRAVENOUS at 04:05

## 2024-05-28 RX ADMIN — HYDRALAZINE HYDROCHLORIDE 10 MG: 20 INJECTION INTRAMUSCULAR; INTRAVENOUS at 08:05

## 2024-05-28 RX ADMIN — METOPROLOL TARTRATE 5 MG: 1 INJECTION, SOLUTION INTRAVENOUS at 08:05

## 2024-05-28 RX ADMIN — AMIODARONE HYDROCHLORIDE 150 MG: 1.5 INJECTION, SOLUTION INTRAVENOUS at 03:05

## 2024-05-28 RX ADMIN — CLEVIPIDINE 10 MG/HR: 0.5 EMULSION INTRAVENOUS at 08:05

## 2024-05-28 RX ADMIN — AMIODARONE HYDROCHLORIDE 1 MG/MIN: 1.8 INJECTION, SOLUTION INTRAVENOUS at 08:05

## 2024-05-28 RX ADMIN — CLEVIPIDINE 14 MG/HR: 0.5 EMULSION INTRAVENOUS at 02:05

## 2024-05-28 RX ADMIN — CLEVIPIDINE 8 MG/HR: 0.5 EMULSION INTRAVENOUS at 11:05

## 2024-05-28 RX ADMIN — CLEVIPIDINE 12 MG/HR: 0.5 EMULSION INTRAVENOUS at 03:05

## 2024-05-28 NOTE — PT/OT/SLP PROGRESS
Occupational Therapy   Treatment    Name: Robert Degroot  MRN: [id number]  Admitting Diagnosis:  <principal problem not specified>  4 Days Post-Op    Recommendations:     Recommended therapy intensity at discharge: Moderate Intensity Therapy   Discharge Equipment Recommendations:  to be determined by next level of care    Assessment:     Robert Degroot is a 63 y.o. male with a medical diagnosis of CVA L MCA occlusion s/p emergent thrombectomy and carotid angioplasty, cerebral edema with impending herniation s/p decompressive craniectomy.  He presents with wife present.  OT performed PROM RUE, remains flaccid.  OT removed L mitt, pt did not follow commands but moving LUE spontaneously.  He was avoiding replacing L mitt.  He was He did not visually  track.  Per wife, she and family have discussed peg placement vs not placing peg.  OT to f/u for final decisions in chart tomorrow.  May sign off if family formally elects hospice. Performance deficits affecting function are weakness, impaired endurance, impaired self care skills, impaired functional mobility, impaired balance, decreased safety awareness, decreased lower extremity function, decreased upper extremity function, impaired cognition, visual deficits, abnormal tone.     Rehab Prognosis:  Fair; patient would benefit from acute skilled OT services to address these deficits and reach maximum level of function.       Plan:     Patient to be seen 3 x/week to address the above listed problems via self-care/home management, therapeutic activities, therapeutic exercises, neuromuscular re-education, cognitive retraining  Plan of Care Expires: 06/28/24  Plan of Care Reviewed with: patient    Subjective     Pain/Comfort:   No pain apparent, pt unable to express pain    Objective:     Communicated with: MANFRED Santana prior to session.  Patient found HOB elevated with  (PICC, vital monitoring, art line) upon OT entry to room.    General Precautions: Standard, aphasia, aspiration,  NPO (-160, helmet to mobilize)    Orthopedic Precautions:N/A  Braces: N/A    Occupational Performance:     Therapeutic Activities:  OT addressed command following (0%), tracking (unable), purposeful movement of LUE (not able to perform).  He did blink to threat bilaterally     Therapeutic Exercise:  PROM RUE, remains flaccid.  Pt actively moving LUE but not to command or purposeful with exception of avoiding mitt to be reapplied.        [facility] 6 Click ADL: 6    Patient Education:  Patient and wife provided with verbal education education regarding OT role/goals/POC and Discharge/DME recommendations.  Understanding was verbalized, however additional teaching warranted.      Patient left HOB elevated with all lines intact and wife present.    GOALS:   Multidisciplinary Problems       Occupational Therapy Goals          Problem: Occupational Therapy    Goal Priority Disciplines Outcome Interventions   Occupational Therapy Goal     OT, PT/OT Progressing    Description: LTG: Pt will perform basic ADLs from w/c level with mod A using LRAD by d/c.    STG: to be met by 6/21  1. Pt will follow >50% of simple one step commands  2. Pt will perform grooming EOB with mod A.   3. Pt will perform functional grasp/reach/release of items >50% of trials in prep for increased participation in ADL tasks.   4. Pt will perform sit<>stand with mod A x 2 in prep for ADL t/fs.                        Time Tracking:     OT Date of Treatment:    OT Start Time: 1414  OT Stop Time: 1425  OT Total Time (min): 11 min    Billable Minutes:Therapeutic Activity 1          Number of CLARK visits since last OT visit: 1    5/28/2024

## 2024-05-28 NOTE — NURSING
Nurses Note -- 4 Eyes      5/28/2024   11:07 AM      Skin assessed during: Daily Assessment      [] No Altered Skin Integrity Present    [x]Prevention Measures Documented      [x] Yes- Altered Skin Integrity Present or Discovered   [] LDA Added if Not in Epic (Describe Wound)   [] New Altered Skin Integrity was Present on Admit and Documented in LDA   [] Wound Image Taken    Wound Care Consulted? No    Attending Nurse:  Esther Aguiar RN/Staff Member:  MANFRED Mccormick

## 2024-05-28 NOTE — PROGRESS NOTES
POD#3 left craniectomy for stroke  s/p mechanical thrombectomy and carotid angioplasty   He is sitting up in bed, NAD  He is currently on oxymask  No acute events overnight    AFVSS  PERRL, EOMI  He is alert, nonverbal. He does track examiner. Questionable command following in the left LE. Spontaneously moving left UE.  Withdraws on the right to deep painful stimuli  Incision c/d/I  ROSELIA output 0/12hrs    Plan: We will remove his ROSELIA drain today  OK for Q2 hour neuro exams  Continue BP parameters 160/90  Keppra BID  PT/OT/ST  SCDs for DVT prophylaxis; ok for lovenox

## 2024-05-28 NOTE — PROGRESS NOTES
[facility] - ICU  Pulmonary Critical Care Note    Patient Name: Robert Degroot  MRN: [id number]  Admission Date: 5/24/2024  Hospital Length of Stay: 4 days  Code Status: DNR  Attending Provider: MILADY Rai MD  Primary Care Provider: Maria Del Rosario Primary Doctor     Subjective:     HPI:   Mr. Degroot is a 64 y/o male with unknown past medical history who presented to [facility] as a transfer from [place] for mechanical thrombectomy secondary to acute CVA.  Per chart review it appears patient attempted to take a nap earlier in the day after which he woke up experiencing hemineglect, aphasia, and right-sided agnosia with right-sided flaccid paralysis and facial droop. CTA head and neck was performed upon arrival to ED showing a non-opacification of the proximal to distal M1 segment of the left MCA suggesting occlusion or severe stenosis.  Last known normal was approx 1700 and patient was not in window for TPA. He arrives to the ICU s/p successful mechanical thrombectomy and carotid angioplasty.  Further history is limited secondary to intubation and sedation.     Hospital Course/Significant events:  5/24/2024: Patient admitted to the ICU s/p mechanical thrombectomy and carotid angioplasty.      24 Hour Interval History:  Neuro exam is largely unchanged.  Patient is currently sleeping/snoring.  Remains on Cleviprex for blood pressure control.      Current Inpatient Medications   aspirin  300 mg Rectal Daily    atorvastatin  80 mg Oral Daily    famotidine (PF)  20 mg Intravenous Q12H    levETIRAcetam (Keppra) IV (PEDS and ADULTS)  500 mg Intravenous Q12H    mupirocin   Nasal BID    sodium chloride 0.9%  10 mL Intravenous Q6H       Current Intravenous Infusions   sodium chloride 0.9%   Intravenous Continuous 60 mL/hr at 05/27/24 1900 Rate Verify at 05/27/24 1900    amiodarone in dextrose 5%  1 mg/min Intravenous Continuous 33.3 mL/hr at 05/28/24 0333 1 mg/min at 05/28/24 0333    clevidipine  0-32 mg/hr Intravenous  Continuous 20 mL/hr at 05/28/24 0806 10 mg/hr at 05/28/24 0806    labetaloL (NORMODYNE,TRANDATE) 500 mg in 100 mL infusion (conc: 5 mg/mL)  0-3 mg/min Intravenous Continuous   Stopped at 05/26/24 1157                Objective:       Intake/Output Summary (Last 24 hours) at 5/28/2024 0840  Last data filed at 5/28/2024 0126  Gross per 24 hour   Intake 2116.57 ml   Output 2450 ml   Net -333.43 ml         Vital Signs (Most Recent):  Temp: 98.3 °F (36.8 °C) (05/28/24 0400)  Pulse: (!) 149 (05/28/24 0715)  Resp: 20 (05/28/24 0715)  BP: (!) 145/68 (05/28/24 0700)  SpO2: 96 % (05/28/24 0715)  Body mass index is 26.86 kg/m².  Weight: 84.9 kg (187 lb 2.7 oz) Vital Signs (24h Range):  Temp:  [98.1 °F (36.7 °C)-100.1 °F (37.8 °C)] 98.3 °F (36.8 °C)  Pulse:  [] 149  Resp:  [7-28] 20  SpO2:  [92 %-99 %] 96 %  BP: (107-181)/(50-99) 145/68  Arterial Line BP: (120-176)/(40-66) 157/64     Physical Exam  Vitals reviewed.   Constitutional:       General: He is not in acute distress.     Appearance: He is normal weight. He is ill-appearing. He is not diaphoretic.      Comments: oxymask is in place.  Patient arousable but nonverbal and did not follow commands   HENT:      Head: Normocephalic.      Comments: Dressing and drain in place  Eyes:      Pupils: Pupils are equal, round, and reactive to light.   Cardiovascular:      Rate and Rhythm: Normal rate and regular rhythm.      Pulses: Normal pulses.      Heart sounds: Normal heart sounds. No murmur heard.  Pulmonary:      Breath sounds: Rhonchi present. No wheezing or rales.      Comments: No resp distress  Abdominal:      General: Bowel sounds are normal. There is no distension.      Palpations: Abdomen is soft.   Musculoskeletal:         General: No swelling.   Skin:     General: Skin is warm and dry.      Findings: No bruising or lesion.   Neurological:      Comments: Patient with persistent right-sided hemiparesis.  He does move the left side.  Left mitten is in place.  Gaze  somewhat deviated to the left             Lines/Drains/Airways       Peripherally Inserted Central Catheter Line  Duration             PICC Triple Lumen 05/24/24 1844 right brachial 3 days              Drain  Duration             Male External Urinary Catheter 05/24/24 0400 Medium 4 days         Closed/Suction Drain 05/25/24 1505 Tube - 1 Scalp Bulb 10 Fr. 2 days              Arterial Line  Duration             Arterial Line 05/25/24 1410 Right Radial 2 days              Peripheral Intravenous Line  Duration                  Peripheral IV - Single Lumen 18 G Left Antecubital -- days         Peripheral IV - Single Lumen 18 G Posterior;Right Hand -- days         Peripheral IV - Single Lumen 05/24/24 1139 Right;Posterior Forearm 3 days                    Significant Labs:    Lab Results   Component Value Date    WBC 12.97 (H) 05/28/2024    HGB 11.7 (L) 05/28/2024    HCT 34.9 (L) 05/28/2024    MCV 94.3 (H) 05/28/2024     05/28/2024         BMP  Lab Results   Component Value Date     (H) 05/28/2024    K 3.3 (L) 05/28/2024    CHLORIDE 122 (H) 05/28/2024    CO2 25 05/28/2024    BUN 13.4 05/28/2024    CREATININE 0.84 05/28/2024    CALCIUM 9.1 05/28/2024    AGAP 7.0 05/28/2024    EGFRNONAA >60 08/30/2021       ABG  Recent Labs   Lab 05/24/24  0005 05/26/24  0520 05/27/24  0556   PH 7.374   < > 7.410   PO2 77*   < > 92.0   PCO2 39.0   < > 42.0   HCO3 22.8*   < > 26.6*   BE -2  --   --     < > = values in this interval not displayed.       Mechanical Ventilation Support:  Vent Mode: CPAP / PSV (05/25/24 2032)  Set Rate: 12 BPM (05/25/24 1401)  Vt Set: 500 mL (05/25/24 1401)  Pressure Support: 8 cmH20 (05/25/24 2032)  PEEP/CPAP: 5 cmH20 (05/25/24 2032)  Oxygen Concentration (%): 30 (05/27/24 2200)  Peak Airway Pressure: 14 cmH20 (05/25/24 2032)  Total Ve: 11.7 L/m (05/25/24 2032)  F/VT Ratio<105 (RSBI): (!) 30.02 (05/25/24 1831)    Significant Imaging:  I have reviewed the pertinent imaging within the past 24  hours.        Assessment/Plan:     Assessment  CVA with left MCA occlusion s/p emergent mechanical thrombectomy and carotid angioplasty   HLD, HTN  Cerebral edema with impending herniation now status post decompressive craniectomy  4.   Oropharyngeal dysphagia secondary to neurologic dysfunction    Plan  Continue Cleviprex for now, will add other agents via PEG once it is placed.  We will continue to monitor his respiratory status closely.  Patient will need long-term enteral access for feeds and meds therefore GI is being consulted for PEG.  Continue hourly neuro checks.    32 minutes of critical care was time spent personally by me on the following activities: development of treatment plan with patient or surrogate and bedside caregivers, discussions with consultants, evaluation of patient's response to treatment, examination of patient, ordering and performing treatments and interventions, ordering and review of laboratory studies, ordering and review of radiographic studies, pulse oximetry, re-evaluation of patient's condition.  This critical care time did not overlap with that of any other provider or involve time for any procedures.     Ricky Rodas MD  Pulmonary Critical Care Medicine  [facility] - ICU

## 2024-05-28 NOTE — PT/OT/SLP PROGRESS
SLP attempting clinical swallow evaluation, however pt lethargic and unable to remain awake. SLP to continue to follow and treat as appropriate.

## 2024-05-28 NOTE — CONSULTS
Inpatient consult to Cardiology  Consult performed by: Demarcus Ortiz FNP  Consult ordered by: Susanna Strauss MD  Reason for consult: AF/AFL        [facility] - 7th Floor ICU    Cardiology  Consult Note    Patient Name: Robert Degroot  MRN: [id number]  Admission Date: 5/24/2024  Hospital Length of Stay: 4 days  Code Status: DNR   Attending Provider: MILADY Rai MD   Consulting Provider: VALERIE Mcarthur  Primary Care Physician: Maria Del Rosario, Primary Doctor  Principal Problem:<principal problem not specified>    Patient information was obtained from past medical records, ER records, and primary team.     Subjective:   Chief Complaint/Reason for Consult: AF/AFL RVR    HPI:   Mr. Degroot is a 63 year old male, unknown to [facility], who presented to the hospital as transfer from [facility] for mechanical thrombectomy due to acute ischemic CVA. Patient presented to the outlying facility after having experienced edmund-neglect, aphasia, and right-sided agnosia with right-sided flaccid paralysis and facial droop. CT Angiogram head and neck revealed a non-opacification of the proximal to distal M1 segment of the left MCA suggesting occlusion or severe stenosis. Last known normal was approx 1700 and patient was not in window for TPA. He underwent successful mechanical thrombectomy and carotid angioplasty and was transferred to the ICU for close monitoring. He developed a cerebral shift and required left frontoparietal temporal craniectomy with removal of dura and relaxation of the brain. During this hospital stay he did develop atrial fibrillation for which [facility] is consulted.     PMH: Nicotine Dependence (Does not follow routinely with MD, Has not seen Physician in Many Years According to Family Report)  PSH: Cerebral Angiogram with Mechanical Thrombectomy, Left frontoparietal temporal craniectomy with removal of dura and   relaxation of brain, placement of drain  Family History: Father- Hypertension/Hyperlipidemia,  Grandfather- MI   Social History: Tobacco- Active Smoker, Alcohol- Negative, Substance Abuse- Negative     Previous Cardiac Diagnostics:   Carotid US (5.24.24):  The bilateral internal carotid artery demonstrated 50-69% stenosis.   Right vertebral arteries is patent with antegrade flow.   Left vertebral artery was not visually appreciated.     Echocardiogram (5.24.24):  Left Ventricle: The left ventricle is normal in size. Normal wall thickness. There is normal systolic function with a visually estimated ejection fraction of 65%. Grade I diastolic dysfunction.  Right Ventricle: Systolic function is normal. TAPSE is 2.43 cm.  Left Atrium: Agitated saline study of the atrial septum is negative, suggesting absence of intracardiac shunt at the atrial level.  Aortic Valve: There is mild aortic valve sclerosis.  Mitral Valve: The mitral valve is structurally normal.  Tricuspid Valve: The tricuspid valve is structurally normal.  Pulmonic Valve: The pulmonic valve is structurally normal.  IVC/SVC: Patient is ventilated, cannot use IVC diameter to estimate right atrial pressure.  Pericardium: There is no pericardial effusion.    CT Angiogram Head & Neck (5.24.24):  There is non-opacification of the proximal to distal M1 segment of the left middle cerebral artery suggesting occlusion or severe stenosis(series 15, image 27). There is associated large acute MCA territorial infarct in the left fontotemporoparietal lobe.  Hemodynamically significant stenosis proximal left internal carotid artery.    Review of patient's allergies indicates:  No Known Allergies  No current facility-administered medications on file prior to encounter.     No current outpatient medications on file prior to encounter.     Review of Systems   Unable to perform ROS: Intubated     Objective:     Vital Signs (Most Recent):  Temp: 98.3 °F (36.8 °C) (05/28/24 0400)  Pulse: (!) 149 (05/28/24 0715)  Resp: 20 (05/28/24 0715)  BP: (!) 145/68 (05/28/24  0700)  SpO2: 96 % (05/28/24 0715) Vital Signs (24h Range):  Temp:  [98.1 °F (36.7 °C)-100.1 °F (37.8 °C)] 98.3 °F (36.8 °C)  Pulse:  [] 149  Resp:  [7-28] 20  SpO2:  [92 %-100 %] 96 %  BP: (107-181)/(50-99) 145/68  Arterial Line BP: (120-176)/(40-66) 157/64   Weight: 84.9 kg (187 lb 2.7 oz)  Body mass index is 26.86 kg/m².  SpO2: 96 %       Intake/Output Summary (Last 24 hours) at 5/28/2024 0724  Last data filed at 5/28/2024 0126  Gross per 24 hour   Intake 2116.57 ml   Output 2450 ml   Net -333.43 ml     Lines/Drains/Airways       Peripherally Inserted Central Catheter Line  Duration             PICC Triple Lumen 05/24/24 1844 right brachial 3 days              Drain  Duration             Male External Urinary Catheter 05/24/24 0400 Medium 4 days         Closed/Suction Drain 05/25/24 1505 Tube - 1 Scalp Bulb 10 Fr. 2 days              Arterial Line  Duration             Arterial Line 05/25/24 1410 Right Radial 2 days              Peripheral Intravenous Line  Duration                  Peripheral IV - Single Lumen 18 G Left Antecubital -- days         Peripheral IV - Single Lumen 18 G Posterior;Right Hand -- days         Peripheral IV - Single Lumen 05/24/24 1139 Right;Posterior Forearm 3 days                  Significant Labs:  Recent Results (from the past 72 hour(s))   Sodium    Collection Time: 05/25/24 12:49 PM   Result Value Ref Range    Sodium 143 136 - 145 mmol/L   Osmolality, Serum    Collection Time: 05/25/24 12:49 PM   Result Value Ref Range    Osmolality 296 280 - 300 mOsm/kg   Tissue Culture - Aerobic    Collection Time: 05/25/24  4:17 PM    Specimen: Skull; Bone   Result Value Ref Range    Tissue - Aerobic Culture No Growth At 48 Hours    Sodium    Collection Time: 05/25/24  6:21 PM   Result Value Ref Range    Sodium 145 136 - 145 mmol/L   Osmolality, Serum    Collection Time: 05/25/24  6:21 PM   Result Value Ref Range    Osmolality 309 (H) 280 - 300 mOsm/kg   Sodium    Collection Time: 05/26/24  12:25 AM   Result Value Ref Range    Sodium 150 (H) 136 - 145 mmol/L   Osmolality, Serum    Collection Time: 05/26/24 12:25 AM   Result Value Ref Range    Osmolality 308 (H) 280 - 300 mOsm/kg   Comprehensive metabolic panel    Collection Time: 05/26/24  4:58 AM   Result Value Ref Range    Sodium 152 (H) 136 - 145 mmol/L    Potassium 3.3 (L) 3.5 - 5.1 mmol/L    Chloride 122 (H) 98 - 107 mmol/L    CO2 22 (L) 23 - 31 mmol/L    Glucose 159 (H) 82 - 115 mg/dL    Blood Urea Nitrogen 10.2 8.4 - 25.7 mg/dL    Creatinine 0.87 0.73 - 1.18 mg/dL    Calcium 8.3 (L) 8.8 - 10.0 mg/dL    Protein Total 6.1 5.8 - 7.6 gm/dL    Albumin 2.9 (L) 3.4 - 4.8 g/dL    Globulin 3.2 2.4 - 3.5 gm/dL    Albumin/Globulin Ratio 0.9 (L) 1.1 - 2.0 ratio    Bilirubin Total 0.3 <=1.5 mg/dL    ALP 38 (L) 40 - 150 unit/L    ALT 12 0 - 55 unit/L    AST 16 5 - 34 unit/L    eGFR >60 mL/min/1.73/m2    Anion Gap 8.0 mEq/L    BUN/Creatinine Ratio 12    CBC with Differential    Collection Time: 05/26/24  4:58 AM   Result Value Ref Range    WBC 11.75 (H) 4.50 - 11.50 x10(3)/mcL    RBC 3.55 (L) 4.70 - 6.10 x10(6)/mcL    Hgb 11.0 (L) 14.0 - 18.0 g/dL    Hct 33.3 (L) 42.0 - 52.0 %    MCV 93.8 80.0 - 94.0 fL    MCH 31.0 27.0 - 31.0 pg    MCHC 33.0 33.0 - 36.0 g/dL    RDW 14.4 11.5 - 17.0 %    Platelet 178 130 - 400 x10(3)/mcL    MPV 10.4 7.4 - 10.4 fL    Neut % 85.6 %    Lymph % 4.9 %    Mono % 8.9 %    Eos % 0.0 %    Basophil % 0.3 %    Lymph # 0.58 (L) 0.6 - 4.6 x10(3)/mcL    Neut # 10.06 (H) 2.1 - 9.2 x10(3)/mcL    Mono # 1.04 0.1 - 1.3 x10(3)/mcL    Eos # 0.00 0 - 0.9 x10(3)/mcL    Baso # 0.03 <=0.2 x10(3)/mcL    IG# 0.04 0 - 0.04 x10(3)/mcL    IG% 0.3 %    NRBC% 0.0 %   RT Blood Gas    Collection Time: 05/26/24  5:20 AM   Result Value Ref Range    Sample Type Arterial Blood     Sample site Arterial Line     Drawn by rcl crt     pH, Blood gas 7.450 7.350 - 7.450    pCO2, Blood gas 32.0 (L) 35.0 - 45.0 mmHg    pO2, Blood gas 63.0 (L) 80.0 - 100.0 mmHg    Sodium,  Blood Gas 146 (H) 137 - 145 mmol/L    Potassium, Blood Gas 3.4 (L) 3.5 - 5.0 mmol/L    Calcium Level Ionized 1.21 1.12 - 1.23 mmol/L    TOC2, Blood gas 23.2 mmol/L    Base Excess, Blood gas -0.90 -2.00 - 2.00 mmol/L    sO2, Blood gas 92.8 %    HCO3, Blood gas 22.2 22.0 - 26.0 mmol/L    THb, Blood gas 15.4 12 - 16 g/dL    O2 Hb, Blood Gas 92.0 (L) 94.0 - 97.0 %    CO Hgb 0.8 0.5 - 1.5 %    Met Hgb 0.7 0.4 - 1.5 %    Allens Test N/A     Oxygen Device, Blood gas Oxy Mask     LPM 6    POCT glucose    Collection Time: 05/26/24  6:04 AM   Result Value Ref Range    POCT Glucose 145 (H) 70 - 110 mg/dL   Osmolality, Serum    Collection Time: 05/26/24  7:37 AM   Result Value Ref Range    Osmolality 317 (H) 280 - 300 mOsm/kg   Magnesium    Collection Time: 05/26/24  7:37 AM   Result Value Ref Range    Magnesium Level 2.20 1.60 - 2.60 mg/dL   Phosphorus    Collection Time: 05/26/24  7:37 AM   Result Value Ref Range    Phosphorus Level 2.0 (L) 2.3 - 4.7 mg/dL   RT Blood Gas    Collection Time: 05/26/24  7:52 AM   Result Value Ref Range    Sample Type Arterial Blood     Sample site Arterial Line     Drawn by ksfb rrt     pH, Blood gas 7.450 7.350 - 7.450    pCO2, Blood gas 34.0 (L) 35.0 - 45.0 mmHg    pO2, Blood gas 104.0 (H) 80.0 - 100.0 mmHg    Sodium, Blood Gas 152 (H) 137 - 145 mmol/L    Potassium, Blood Gas 3.2 (L) 3.5 - 5.0 mmol/L    Calcium Level Ionized 1.15 1.12 - 1.23 mmol/L    TOC2, Blood gas 24.6 mmol/L    Base Excess, Blood gas 0.10 mmol/L    sO2, Blood gas 98.0 %    HCO3, Blood gas 23.6 22.0 - 26.0 mmol/L    Allens Test N/A     Oxygen Device, Blood gas Non Rebreather     LPM 15    Sodium    Collection Time: 05/26/24 11:33 AM   Result Value Ref Range    Sodium 151 (H) 136 - 145 mmol/L   Osmolality, Serum    Collection Time: 05/26/24 11:33 AM   Result Value Ref Range    Osmolality 320 (H) 280 - 300 mOsm/kg   Sodium    Collection Time: 05/26/24  6:13 PM   Result Value Ref Range    Sodium 156 (H) 136 - 145 mmol/L    Osmolality, Serum    Collection Time: 05/26/24  6:13 PM   Result Value Ref Range    Osmolality 324 (HH) 280 - 300 mOsm/kg   Sodium    Collection Time: 05/27/24 12:16 AM   Result Value Ref Range    Sodium 158 (H) 136 - 145 mmol/L   Osmolality, Serum    Collection Time: 05/27/24 12:16 AM   Result Value Ref Range    Osmolality 323 (HH) 280 - 300 mOsm/kg   Comprehensive metabolic panel    Collection Time: 05/27/24  3:33 AM   Result Value Ref Range    Sodium 157 (H) 136 - 145 mmol/L    Potassium 3.2 (L) 3.5 - 5.1 mmol/L    Chloride 127 (HH) 98 - 107 mmol/L    CO2 21 (L) 23 - 31 mmol/L    Glucose 146 (H) 82 - 115 mg/dL    Blood Urea Nitrogen 11.1 8.4 - 25.7 mg/dL    Creatinine 0.80 0.73 - 1.18 mg/dL    Calcium 8.4 (L) 8.8 - 10.0 mg/dL    Protein Total 5.6 (L) 5.8 - 7.6 gm/dL    Albumin 2.7 (L) 3.4 - 4.8 g/dL    Globulin 2.9 2.4 - 3.5 gm/dL    Albumin/Globulin Ratio 0.9 (L) 1.1 - 2.0 ratio    Bilirubin Total 0.4 <=1.5 mg/dL    ALP 44 40 - 150 unit/L    ALT 20 0 - 55 unit/L    AST 26 5 - 34 unit/L    eGFR >60 mL/min/1.73/m2    Anion Gap 9.0 mEq/L    BUN/Creatinine Ratio 14    CBC with Differential    Collection Time: 05/27/24  3:33 AM   Result Value Ref Range    WBC 11.46 4.50 - 11.50 x10(3)/mcL    RBC 3.41 (L) 4.70 - 6.10 x10(6)/mcL    Hgb 10.8 (L) 14.0 - 18.0 g/dL    Hct 32.1 (L) 42.0 - 52.0 %    MCV 94.1 (H) 80.0 - 94.0 fL    MCH 31.7 (H) 27.0 - 31.0 pg    MCHC 33.6 33.0 - 36.0 g/dL    RDW 14.6 11.5 - 17.0 %    Platelet 174 130 - 400 x10(3)/mcL    MPV 10.2 7.4 - 10.4 fL    Neut % 84.5 %    Lymph % 6.4 %    Mono % 8.5 %    Eos % 0.0 %    Basophil % 0.3 %    Lymph # 0.73 0.6 - 4.6 x10(3)/mcL    Neut # 9.69 (H) 2.1 - 9.2 x10(3)/mcL    Mono # 0.97 0.1 - 1.3 x10(3)/mcL    Eos # 0.00 0 - 0.9 x10(3)/mcL    Baso # 0.03 <=0.2 x10(3)/mcL    IG# 0.04 0 - 0.04 x10(3)/mcL    IG% 0.3 %    NRBC% 0.0 %   RT Blood Gas    Collection Time: 05/27/24  5:56 AM   Result Value Ref Range    Sample Type Arterial Blood     Sample site Arterial  Line     Drawn by sd rrt     pH, Blood gas 7.410 7.350 - 7.450    pCO2, Blood gas 42.0 35.0 - 45.0 mmHg    pO2, Blood gas 92.0 80.0 - 100.0 mmHg    Sodium, Blood Gas 153 (H) 137 - 145 mmol/L    Potassium, Blood Gas 3.1 (L) 3.5 - 5.0 mmol/L    Calcium Level Ionized 1.23 1.12 - 1.23 mmol/L    TOC2, Blood gas 27.9 mmol/L    Base Excess, Blood gas 1.70 -2.00 - 2.00 mmol/L    sO2, Blood gas 97.2 %    HCO3, Blood gas 26.6 (H) 22.0 - 26.0 mmol/L    THb, Blood gas 10.9 (L) 12 - 16 g/dL    O2 Hb, Blood Gas 97.0 94.0 - 97.0 %    CO Hgb 0.8 0.5 - 1.5 %    Met Hgb 0.1 (L) 0.4 - 1.5 %    Allens Test N/A     Oxygen Device, Blood gas Non Rebreather    Sodium    Collection Time: 05/27/24  5:57 AM   Result Value Ref Range    Sodium 157 (H) 136 - 145 mmol/L   Osmolality, Serum    Collection Time: 05/27/24  5:57 AM   Result Value Ref Range    Osmolality 325 (HH) 280 - 300 mOsm/kg   POCT glucose    Collection Time: 05/27/24  5:59 AM   Result Value Ref Range    POCT Glucose 135 (H) 70 - 110 mg/dL   POCT Glucose, Hand-Held Device    Collection Time: 05/27/24  6:15 AM   Result Value Ref Range    POC Glucose 135 (A) 70 - 110 MG/DL   Sodium    Collection Time: 05/27/24 11:50 AM   Result Value Ref Range    Sodium 158 (H) 136 - 145 mmol/L   Osmolality, Serum    Collection Time: 05/27/24 11:50 AM   Result Value Ref Range    Osmolality 326 (HH) 280 - 300 mOsm/kg   Sodium    Collection Time: 05/27/24  6:01 PM   Result Value Ref Range    Sodium 158 (H) 136 - 145 mmol/L   Osmolality, Serum    Collection Time: 05/27/24  6:01 PM   Result Value Ref Range    Osmolality 326 (HH) 280 - 300 mOsm/kg   Comprehensive Metabolic Panel    Collection Time: 05/27/24  8:53 PM   Result Value Ref Range    Sodium 158 (H) 136 - 145 mmol/L    Potassium 3.2 (L) 3.5 - 5.1 mmol/L    Chloride 126 (H) 98 - 107 mmol/L    CO2 25 23 - 31 mmol/L    Glucose 134 (H) 82 - 115 mg/dL    Blood Urea Nitrogen 13.9 8.4 - 25.7 mg/dL    Creatinine 0.84 0.73 - 1.18 mg/dL    Calcium 9.1  8.8 - 10.0 mg/dL    Protein Total 6.7 5.8 - 7.6 gm/dL    Albumin 2.5 (L) 3.4 - 4.8 g/dL    Globulin 4.2 (H) 2.4 - 3.5 gm/dL    Albumin/Globulin Ratio 0.6 (L) 1.1 - 2.0 ratio    Bilirubin Total 0.4 <=1.5 mg/dL    ALP 59 40 - 150 unit/L    ALT 39 0 - 55 unit/L    AST 44 (H) 5 - 34 unit/L    eGFR >60 mL/min/1.73/m2    Anion Gap 7.0 mEq/L    BUN/Creatinine Ratio 17    Magnesium    Collection Time: 05/27/24  8:53 PM   Result Value Ref Range    Magnesium Level 2.40 1.60 - 2.60 mg/dL   Phosphorus    Collection Time: 05/27/24  8:53 PM   Result Value Ref Range    Phosphorus Level 2.1 (L) 2.3 - 4.7 mg/dL   EKG 12-lead    Collection Time: 05/27/24 11:33 PM   Result Value Ref Range    QRS Duration 82 ms    [facility] QTC Calculation 489 ms   Sodium    Collection Time: 05/27/24 11:56 PM   Result Value Ref Range    Sodium 158 (H) 136 - 145 mmol/L   Osmolality, Serum    Collection Time: 05/27/24 11:56 PM   Result Value Ref Range    Osmolality 323 (HH) 280 - 300 mOsm/kg   Comprehensive metabolic panel    Collection Time: 05/28/24  3:39 AM   Result Value Ref Range    Sodium 154 (H) 136 - 145 mmol/L    Potassium 3.3 (L) 3.5 - 5.1 mmol/L    Chloride 122 (H) 98 - 107 mmol/L    CO2 25 23 - 31 mmol/L    Glucose 168 (H) 82 - 115 mg/dL    Blood Urea Nitrogen 13.4 8.4 - 25.7 mg/dL    Creatinine 0.84 0.73 - 1.18 mg/dL    Calcium 9.1 8.8 - 10.0 mg/dL    Protein Total 6.9 5.8 - 7.6 gm/dL    Albumin 2.5 (L) 3.4 - 4.8 g/dL    Globulin 4.4 (H) 2.4 - 3.5 gm/dL    Albumin/Globulin Ratio 0.6 (L) 1.1 - 2.0 ratio    Bilirubin Total 0.4 <=1.5 mg/dL    ALP 63 40 - 150 unit/L    ALT 35 0 - 55 unit/L    AST 27 5 - 34 unit/L    eGFR >60 mL/min/1.73/m2    Anion Gap 7.0 mEq/L    BUN/Creatinine Ratio 16    CBC with Differential    Collection Time: 05/28/24  3:39 AM   Result Value Ref Range    WBC 12.97 (H) 4.50 - 11.50 x10(3)/mcL    RBC 3.70 (L) 4.70 - 6.10 x10(6)/mcL    Hgb 11.7 (L) 14.0 - 18.0 g/dL    Hct 34.9 (L) 42.0 - 52.0 %    MCV 94.3 (H) 80.0 - 94.0 fL     MCH 31.6 (H) 27.0 - 31.0 pg    MCHC 33.5 33.0 - 36.0 g/dL    RDW 14.6 11.5 - 17.0 %    Platelet 193 130 - 400 x10(3)/mcL    MPV 10.8 (H) 7.4 - 10.4 fL    Neut % 83.9 %    Lymph % 8.0 %    Mono % 7.1 %    Eos % 0.2 %    Basophil % 0.3 %    Lymph # 1.04 0.6 - 4.6 x10(3)/mcL    Neut # 10.88 (H) 2.1 - 9.2 x10(3)/mcL    Mono # 0.92 0.1 - 1.3 x10(3)/mcL    Eos # 0.02 0 - 0.9 x10(3)/mcL    Baso # 0.04 <=0.2 x10(3)/mcL    IG# 0.07 (H) 0 - 0.04 x10(3)/mcL    IG% 0.5 %    NRBC% 0.0 %   Sodium    Collection Time: 05/28/24  5:48 AM   Result Value Ref Range    Sodium 154 (H) 136 - 145 mmol/L   Osmolality, Serum    Collection Time: 05/28/24  5:48 AM   Result Value Ref Range    Osmolality 326 (HH) 280 - 300 mOsm/kg         EKG:       Telemetry:  SR with PACS & Short Bursts of AF RVR    Physical Exam  Vitals and nursing note reviewed.   Constitutional:       General: He is not in acute distress.     Appearance: He is ill-appearing.   HENT:      Head: Normocephalic.      Mouth/Throat:      Mouth: Mucous membranes are moist.   Cardiovascular:      Heart sounds: Normal heart sounds.   Pulmonary:      Effort: Pulmonary effort is normal. No respiratory distress.      Breath sounds: Normal breath sounds.      Comments: Intubated/Mechanical Ventilation- Vent Associated Breath Sounds  Abdominal:      Palpations: Abdomen is soft.   Skin:     General: Skin is warm and dry.   Neurological:      Comments: Obtunded, Intermittently follows commands with left side. Withdraws with right side. Opens eyes to loud verbal stimuli.        Home Medications:   No current facility-administered medications on file prior to encounter.     No current outpatient medications on file prior to encounter.     Current Inpatient Medications:    Current Facility-Administered Medications:     0.9%  NaCl infusion, , Intravenous, Continuous, Ilnsey, Radha W, NP, Last Rate: 60 mL/hr at 05/27/24 1900, Rate Verify at 05/27/24 1900    acetaminophen suppository 650 mg,  650 mg, Rectal, Q4H PRN, Robel Wagoner PA, 650 mg at 05/27/24 1124    acetaminophen tablet 650 mg, 650 mg, Oral, Q4H PRN, Robel Wagoner PA    amiodarone 360 mg/200 mL (1.8 mg/mL) infusion, 1 mg/min, Intravenous, Continuous, Susanna Strauss MD, Last Rate: 33.3 mL/hr at 05/28/24 0333, 1 mg/min at 05/28/24 0333    aspirin suppository 300 mg, 300 mg, Rectal, Daily, Radha Stiles NP, 300 mg at 05/27/24 0804    atorvastatin tablet 80 mg, 80 mg, Oral, Daily, Susanna Strauss MD    bisacodyL suppository 10 mg, 10 mg, Rectal, Daily PRN, Radha Stiles NP    clevidipine (CLEVIPREX) 25 mg/50 mL infusion, 0-32 mg/hr, Intravenous, Continuous, Ricky Rodas MD, Last Rate: 24 mL/hr at 05/28/24 0605, 12 mg/hr at 05/28/24 0605    famotidine (PF) injection 20 mg, 20 mg, Intravenous, Q12H, Robel Wagoner PA, 20 mg at 05/27/24 2053    hydrALAZINE injection 10 mg, 10 mg, Intravenous, Q6H PRN, Susanna Strauss MD, 10 mg at 05/25/24 1655    HYDROcodone-acetaminophen 5-325 mg per tablet 1 tablet, 1 tablet, Oral, Q4H PRN, Robel Wagoner PA    labetaloL (NORMODYNE,TRANDATE) 500 mg in 100 mL infusion (conc: 5 mg/mL), 0-3 mg/min, Intravenous, Continuous, Dinh Pan MD, Stopped at 05/26/24 1157    levETIRAcetam (Keppra) 500 mg in dextrose 5 % in water (D5W) 100 mL IVPB (MB+), 500 mg, Intravenous, Q12H, Gerald Quesada MD, Stopped at 05/27/24 2125    metoprolol injection 5 mg, 5 mg, Intravenous, Q5 Min PRN, Serg Corrales DO, 5 mg at 05/27/24 2115    morphine injection 2 mg, 2 mg, Intravenous, Q4H PRN, Robel Wagoner PA, 2 mg at 05/27/24 2303    mupirocin 2 % ointment, , Nasal, BID, Gerald Quesada MD, Given at 05/27/24 2053    ondansetron disintegrating tablet 8 mg, 8 mg, Oral, Q8H PRN, Gerald Quesada MD    ondansetron injection 4 mg, 4 mg, Intravenous, Q6H PRN, Robel Wagoner PA    prochlorperazine injection Soln 5 mg, 5 mg, Intravenous, Q6H PRN, Robel Wagoner PA    sodium chloride 0.9% flush 10 mL, 10  mL, Intravenous, PRN, Gerald Quesada MD    Flushing PICC/Midline Protocol, , , Until Discontinued **AND** sodium chloride 0.9% flush 10 mL, 10 mL, Intravenous, Q6H, 10 mL at 05/28/24 0540 **AND** sodium chloride 0.9% flush 10 mL, 10 mL, Intravenous, PRN, Gerald Quesada MD  VTE Risk Mitigation (From admission, onward)           Ordered     Reason for No Pharmacological VTE Prophylaxis  Once        Question:  Reasons:  Answer:  Risk of Bleeding    05/24/24 1140     IP VTE HIGH RISK PATIENT  Once         05/24/24 1140     Place sequential compression device  Until discontinued         05/24/24 1140                  Assessment:   Atrial Flutter with RVR (New Onset)- Now SR (On Amiodarone Infusion)    - [id number]: 4    - EF 65%    - TSH Normal   Acute Ischemic CVA with Left MCA Occlusion Status Post Emergent Mechanical Thrombectomy and Carotid Angioplasty  Cerebral Edema with Impending Herniation    - Status Post Decompressive Craniectomy  Hypertension    - On Cleviprex Infusion as per Neuro Guidance   Hyperlipidemia    - Total Cholesterol: 270 LDL: 156, Triglycerides: 393    - Now on Statin   Acute Hypoxemic Respiratory Failure in Setting of Large Ischemic CVA with Cerebral Edema (Requiring Craniectomy)- Intubation/Mechanical Ventilation  CVD    - The bilateral internal carotid artery demonstrated 50-69% stenosis (Carotid US)  Oropharyngeal Dysphagia Secondary to Neurological Dysfunction  Anemia  Electrolyte Derangements- Hypokalemia/Hypernatremia  Nicotine Dependence/Active Smoker  DNR Status     Plan:   Continue Amiodarone Infusion per Protocol  On Cleviprex Infusion for Therapeutic BP Control Post CVA  Start Metoprolol Tartrate 25 Mg BID with Hold Parameters   Continue Aspirin/Statin  Recommend Starting Eliquis 5 Mg PO BID when safe from Neurosurgical/Neurological Standpoint.  Patient is DNR Status. Supportive Care as per ICU Team.    Thank you for your consult.     Demarcus Ortiz, VALERIE  Cardiology  [facility] - 7th Floor ICU  05/28/2024     Physician addendum:  I have seen and examined this patient as a split-shared visit with the NATACHA d/t complicated medical management of above problems written in assessment and high acuity requiring physician expertise in medical decision-making. I performed the substantive portion of the history and exam. Above medical decision-making is also formulated by me.    Cardiovascular exam:  S1, S2  Lungs:  fine crackles at bases.  Extremities:  1+ edema bilaterally    Plan:  Medications as above.  Continue supportive therapy.  We will follow up.      Zane Baltazar MD  Cardiologist

## 2024-05-28 NOTE — PROGRESS NOTES
Patient Name: Robert Degroot   MRN: [id number]   Admission Date: 5/24/2024   Hospital Length of Stay: 4   Attending Provider: MILADY Rai MD   Consulting Provider: Miguel Lanier MD    Primary Care Physician:  No, Primary Doctor     Principal Problem: <principal problem not specified>       Final diagnoses:  [I63.9] Stroke      Assessment/Plan:     We met with the patient's wife at bedside to the patient. We reviewed further decisions made after family meeting yesterday. She stated that she and his boys agreed that if the patient is not expected to improve neurologically, he would not wish to continue to live this way. They do not wish to insert a PEG for the purpose of artificial nutrition. I verified that they understood that the patient would die without nutrition. She stated that she understood. I spoke to them about the option of inpatient hospice. I believe that he is eligible due to the fact that he will require IV medications to manage symptoms and I expect his decline to occur rapidly over the next 2 weeks until death. I spoke to his wife about prognosis and the expected changes that may occur as he declines. I affirmed her that hospice would provide him with comfort measures an education to them along the way. I will consult CM to assist with transfer after family decides on a facility.    Interval History:     No acute changes as the patient remains without evidence of recognition. He is unable to follow commands, track with eyes.      Active Ambulatory Problems     Diagnosis Date Noted    No Active Ambulatory Problems     Resolved Ambulatory Problems     Diagnosis Date Noted    No Resolved Ambulatory Problems     No Additional Past Medical History        Past Surgical History:   Procedure Laterality Date    CRANIECTOMY N/A 5/25/2024    Procedure: CRANIECTOMY;  Surgeon: Rico Adam MD;  Location: [facility];  Service: Neurosurgery;  Laterality: N/A;        Review of patient's allergies  indicates:  No Known Allergies       Current Facility-Administered Medications:     0.9%  NaCl infusion, , Intravenous, Continuous, Radha Stiles NP, Last Rate: 60 mL/hr at 05/27/24 1900, Rate Verify at 05/27/24 1900    acetaminophen suppository 650 mg, 650 mg, Rectal, Q4H PRN, Robel Wagoner PA, 650 mg at 05/27/24 1124    acetaminophen tablet 650 mg, 650 mg, Oral, Q4H PRN, Rizwana, PANTERA Huston    amiodarone 360 mg/200 mL (1.8 mg/mL) infusion, 1 mg/min, Intravenous, Continuous, Susanna Strauss MD, Last Rate: 33.3 mL/hr at 05/28/24 1609, 1 mg/min at 05/28/24 1609    aspirin suppository 300 mg, 300 mg, Rectal, Daily, Radha Stiles NP, 300 mg at 05/28/24 0805    atorvastatin tablet 80 mg, 80 mg, Oral, Daily, Susanna Strauss MD    bisacodyL suppository 10 mg, 10 mg, Rectal, Daily PRN, Radha Stiles NP    clevidipine (CLEVIPREX) 25 mg/50 mL infusion, 0-32 mg/hr, Intravenous, Continuous, Ricky Rodas MD, Last Rate: 28 mL/hr at 05/28/24 1609, 14 mg/hr at 05/28/24 1609    famotidine (PF) injection 20 mg, 20 mg, Intravenous, Q12H, Robel Wagoner PA, 20 mg at 05/28/24 0805    hydrALAZINE injection 10 mg, 10 mg, Intravenous, Q6H PRN, Susanna Strauss MD, 10 mg at 05/25/24 1655    HYDROcodone-acetaminophen 5-325 mg per tablet 1 tablet, 1 tablet, Oral, Q4H PRN, Robel Wagoner PA    labetaloL (NORMODYNE,TRANDATE) 500 mg in 100 mL infusion (conc: 5 mg/mL), 0-3 mg/min, Intravenous, Continuous, Dinh Pan MD, Stopped at 05/26/24 1157    levETIRAcetam (Keppra) 500 mg in dextrose 5 % in water (D5W) 100 mL IVPB (MB+), 500 mg, Intravenous, Q12H, Gerald Quesada MD, Stopped at 05/28/24 0841    metoprolol tartrate (LOPRESSOR) tablet 25 mg, 25 mg, Per OG tube, BID, Demarcus Ortiz, ISSACP    morphine injection 2 mg, 2 mg, Intravenous, Q4H PRN, Day, PANTERA Huston, 2 mg at 05/27/24 2303    mupirocin 2 % ointment, , Nasal, BID, Gerald Quesada MD, Given at 05/28/24 0835    ondansetron disintegrating tablet  "8 mg, 8 mg, Oral, Q8H PRN, Gerald Quesada MD    ondansetron injection 4 mg, 4 mg, Intravenous, Q6H PRN, DayRobel PA    prochlorperazine injection Soln 5 mg, 5 mg, Intravenous, Q6H PRN, Day, PANTERA Huston    sodium chloride 0.9% flush 10 mL, 10 mL, Intravenous, PRN, Gerald Quesada MD    Flushing PICC/Midline Protocol, , , Until Discontinued **AND** sodium chloride 0.9% flush 10 mL, 10 mL, Intravenous, Q6H, 10 mL at 05/28/24 1139 **AND** sodium chloride 0.9% flush 10 mL, 10 mL, Intravenous, PRN, Gerald Quesada MD       Current Facility-Administered Medications:     acetaminophen, 650 mg, Rectal, Q4H PRN    acetaminophen, 650 mg, Oral, Q4H PRN    bisacodyL, 10 mg, Rectal, Daily PRN    hydrALAZINE, 10 mg, Intravenous, Q6H PRN    HYDROcodone-acetaminophen, 1 tablet, Oral, Q4H PRN    morphine, 2 mg, Intravenous, Q4H PRN    ondansetron, 8 mg, Oral, Q8H PRN    ondansetron, 4 mg, Intravenous, Q6H PRN    prochlorperazine, 5 mg, Intravenous, Q6H PRN    sodium chloride 0.9%, 10 mL, Intravenous, PRN    Flushing PICC/Midline Protocol, , , Until Discontinued **AND** sodium chloride 0.9%, 10 mL, Intravenous, Q6H **AND** sodium chloride 0.9%, 10 mL, Intravenous, PRN     No family history on file.       Review of Systems   Unable to perform ROS: Mental status change            Objective:   BP (!) 157/78   Pulse 76   Temp 97.6 °F (36.4 °C) (Oral)   Resp 16   Ht 5' 10" (1.778 m)   Wt 84.9 kg (187 lb 2.7 oz)   SpO2 98%   BMI 26.86 kg/m²      Physical Exam   Constitutional: He appears ill.   HENT:   Right Ear: External ear normal.   Left Ear: External ear normal.   Nose: Nose normal.   Mouth/Throat: Mucous membranes are moist. Oropharynx is clear.   Eyes: Pupils are equal, round, and reactive to light. Conjunctivae are normal.   Cardiovascular: Normal rate, regular rhythm, normal heart sounds and normal pulses. Pulmonary:      Effort: Pulmonary effort is normal.      Breath sounds: Rhonchi present.     Abdominal: " Soft. Bowel sounds are normal. He exhibits no distension. There is no abdominal tenderness.   Musculoskeletal:      Right lower leg: No edema.      Left lower leg: No edema.   Neurological: He is disoriented.   Skin: Skin is warm.   Vitals reviewed.         Review of Symptoms  Review of Symptoms      Symptom Assessment (ESAS 0-10 Scale)  Unable to complete assessment due to Mental status change     CAM / Delirium:  Positive      Pain Assessment in Advanced Demential Scale (PAINAD)   Breathing - Independent of vocalization:  0  Negative vocalization:  0  Facial expression:  0  Body language:  0  Consolability:  0  Total:  0    Modified Alanis Scale:  0    Performance Status:  30    Living Arrangements:  Lives with spouse and Lives in home    Psychosocial/Cultural:   See Palliative Psychosocial Note: No  **Primary  to Follow**  Palliative Care  Consult: No    Spiritual:  F - Stacie and Belief:  Sabianist     Time-Based Charting:  Yes    Total Time Spent: 0 minutes      Advance Care Planning   Advance Directives:   Do Not Resuscitate Status: Yes    Agent's Name:  WifeMacrina   Agent's Contact Number:  [phone]    Decision Making:  Family answered questions and Patient unable to communicate due to disease severity/cognitive impairment  Goals of Care: What is most important right now is to focus on remaining as independent as possible, improvement in condition but with limits to invasive therapies, comfort and QOL . Accordingly, we have decided that the best plan to meet the patient's goals includes transfer to inpatient hospice.                > 50% of 28 min of encounter was spent in chart review, face to face discussion of goals of care, symptom assessment, coordination of care and emotional support.     Miguel Lanier MD  Palliative Medicine  [facility]

## 2024-05-28 NOTE — PROGRESS NOTES
Patient's ROSELIA was removed  A 3-0 prolene was applied to the drain site. He tolerated this without complications.  OK to leave incision open to air

## 2024-05-28 NOTE — PLAN OF CARE
05/28/24 1611   Discharge Reassessment   Assessment Type Discharge Planning Reassessment   Did the patient's condition or plan change since previous assessment? Yes   Discharge Plan discussed with: Spouse/sig other   Name(s) and Number(s) wifeTegan   Communicated HENRY with patient/caregiver Date not available/Unable to determine   Discharge Plan A Inpatient Hospice   Discharge Plan B Inpatient Hospice   DME Needed Upon Discharge  none   Transition of Care Barriers None   Why the patient remains in the hospital Other (see comment)  (wife requests hospice consultation tomorrow)   Post-Acute Status   Post-Acute Authorization Hospice   Hospice Status Referrals Sent   Patient choice form signed by patient/caregiver   (gave patient choice of two inpatient hospices)   Discharge Delays None known at this time     Spoke to patient about inpatient hospice and provided choice. She chose [facility]. Notified Monika with [facility] and sent referral via [facility]. Wife requests visit tomorrow.

## 2024-05-28 NOTE — TRANSFER OF CARE
"Anesthesia Transfer of Care Note    Patient: Roebrt Degroot    Procedure(s) Performed: Procedure(s) (LRB):  CRANIECTOMY (N/A)    Patient location: ICU    Anesthesia Type: general    Transport from OR: Transported from OR intubated on 100% O2 by AMBU with assisted ventilation    Post pain: adequate analgesia    Post assessment: no apparent anesthetic complications    Post vital signs: stable    Level of consciousness: sedated    Nausea/Vomiting: no nausea/vomiting    Complications: none    Transfer of care protocol was followed      Last vitals: Visit Vitals  /81   Pulse 61   Temp 36.8 °C (98.3 °F) (Axillary)   Resp 20   Ht 5' 10" (1.778 m)   Wt 84.9 kg (187 lb 2.7 oz)   SpO2 97%   BMI 26.86 kg/m²     " patient/health record

## 2024-05-29 VITALS
RESPIRATION RATE: 20 BRPM | DIASTOLIC BLOOD PRESSURE: 76 MMHG | TEMPERATURE: 98 F | WEIGHT: 183 LBS | OXYGEN SATURATION: 95 % | HEIGHT: 70 IN | SYSTOLIC BLOOD PRESSURE: 156 MMHG | BODY MASS INDEX: 26.2 KG/M2 | HEART RATE: 82 BPM

## 2024-05-29 LAB
ALBUMIN SERPL-MCNC: 2.4 G/DL (ref 3.4–4.8)
ALBUMIN/GLOB SERPL: 0.7 RATIO (ref 1.1–2)
ALP SERPL-CCNC: 94 UNIT/L (ref 40–150)
ALT SERPL-CCNC: 72 UNIT/L (ref 0–55)
ANION GAP SERPL CALC-SCNC: 9 MEQ/L
AST SERPL-CCNC: 55 UNIT/L (ref 5–34)
BASOPHILS # BLD AUTO: 0.05 X10(3)/MCL
BASOPHILS NFR BLD AUTO: 0.5 %
BILIRUB SERPL-MCNC: 0.6 MG/DL
BUN SERPL-MCNC: 16.7 MG/DL (ref 8.4–25.7)
CALCIUM SERPL-MCNC: 8.5 MG/DL (ref 8.8–10)
CHLORIDE SERPL-SCNC: 118 MMOL/L (ref 98–107)
CO2 SERPL-SCNC: 26 MMOL/L (ref 23–31)
CREAT SERPL-MCNC: 0.84 MG/DL (ref 0.73–1.18)
CREAT/UREA NIT SERPL: 20
EOSINOPHIL # BLD AUTO: 0.06 X10(3)/MCL (ref 0–0.9)
EOSINOPHIL NFR BLD AUTO: 0.6 %
ERYTHROCYTE [DISTWIDTH] IN BLOOD BY AUTOMATED COUNT: 14.6 % (ref 11.5–17)
GFR SERPLBLD CREATININE-BSD FMLA CKD-EPI: >60 ML/MIN/1.73/M2
GLOBULIN SER-MCNC: 3.4 GM/DL (ref 2.4–3.5)
GLUCOSE SERPL-MCNC: 128 MG/DL (ref 82–115)
HCT VFR BLD AUTO: 34.5 % (ref 42–52)
HGB BLD-MCNC: 11.7 G/DL (ref 14–18)
IMM GRANULOCYTES # BLD AUTO: 0.08 X10(3)/MCL (ref 0–0.04)
IMM GRANULOCYTES NFR BLD AUTO: 0.8 %
LYMPHOCYTES # BLD AUTO: 1.05 X10(3)/MCL (ref 0.6–4.6)
LYMPHOCYTES NFR BLD AUTO: 10 %
MCH RBC QN AUTO: 31.8 PG (ref 27–31)
MCHC RBC AUTO-ENTMCNC: 33.9 G/DL (ref 33–36)
MCV RBC AUTO: 93.8 FL (ref 80–94)
MONOCYTES # BLD AUTO: 0.95 X10(3)/MCL (ref 0.1–1.3)
MONOCYTES NFR BLD AUTO: 9.1 %
NEUTROPHILS # BLD AUTO: 8.28 X10(3)/MCL (ref 2.1–9.2)
NEUTROPHILS NFR BLD AUTO: 79 %
NRBC BLD AUTO-RTO: 0 %
PLATELET # BLD AUTO: 192 X10(3)/MCL (ref 130–400)
PMV BLD AUTO: 11.7 FL (ref 7.4–10.4)
POTASSIUM SERPL-SCNC: 3 MMOL/L (ref 3.5–5.1)
PROT SERPL-MCNC: 5.8 GM/DL (ref 5.8–7.6)
RBC # BLD AUTO: 3.68 X10(6)/MCL (ref 4.7–6.1)
SODIUM SERPL-SCNC: 153 MMOL/L (ref 136–145)
WBC # SPEC AUTO: 10.47 X10(3)/MCL (ref 4.5–11.5)

## 2024-05-29 PROCEDURE — 94761 N-INVAS EAR/PLS OXIMETRY MLT: CPT

## 2024-05-29 PROCEDURE — C9248 INJ, CLEVIDIPINE BUTYRATE: HCPCS | Performed by: INTERNAL MEDICINE

## 2024-05-29 PROCEDURE — 63600175 PHARM REV CODE 636 W HCPCS

## 2024-05-29 PROCEDURE — 63600175 PHARM REV CODE 636 W HCPCS: Performed by: PSYCHIATRY & NEUROLOGY

## 2024-05-29 PROCEDURE — 94760 N-INVAS EAR/PLS OXIMETRY 1: CPT

## 2024-05-29 PROCEDURE — 85025 COMPLETE CBC W/AUTO DIFF WBC: CPT

## 2024-05-29 PROCEDURE — 27000513 HC SENSOR FLOTRAC

## 2024-05-29 PROCEDURE — 25000003 PHARM REV CODE 250: Performed by: PSYCHIATRY & NEUROLOGY

## 2024-05-29 PROCEDURE — 36415 COLL VENOUS BLD VENIPUNCTURE: CPT

## 2024-05-29 PROCEDURE — 25000003 PHARM REV CODE 250

## 2024-05-29 PROCEDURE — A4216 STERILE WATER/SALINE, 10 ML: HCPCS | Performed by: PSYCHIATRY & NEUROLOGY

## 2024-05-29 PROCEDURE — 25000003 PHARM REV CODE 250: Performed by: NURSE PRACTITIONER

## 2024-05-29 PROCEDURE — 80053 COMPREHEN METABOLIC PANEL: CPT

## 2024-05-29 PROCEDURE — 63600175 PHARM REV CODE 636 W HCPCS: Performed by: INTERNAL MEDICINE

## 2024-05-29 RX ORDER — POTASSIUM CHLORIDE 14.9 MG/ML
40 INJECTION INTRAVENOUS ONCE
Status: COMPLETED | OUTPATIENT
Start: 2024-05-29 | End: 2024-05-29

## 2024-05-29 RX ADMIN — AMIODARONE HYDROCHLORIDE 0.5 MG/MIN: 1.8 INJECTION, SOLUTION INTRAVENOUS at 08:05

## 2024-05-29 RX ADMIN — SODIUM CHLORIDE: 9 INJECTION, SOLUTION INTRAVENOUS at 02:05

## 2024-05-29 RX ADMIN — CLEVIPIDINE 10 MG/HR: 0.5 EMULSION INTRAVENOUS at 08:05

## 2024-05-29 RX ADMIN — SODIUM CHLORIDE, PRESERVATIVE FREE 10 ML: 5 INJECTION INTRAVENOUS at 12:05

## 2024-05-29 RX ADMIN — LEVETIRACETAM 500 MG: 100 INJECTION, SOLUTION INTRAVENOUS at 08:05

## 2024-05-29 RX ADMIN — LABETALOL HYDROCHLORIDE 10 MG: 5 INJECTION, SOLUTION INTRAVENOUS at 04:05

## 2024-05-29 RX ADMIN — CLEVIPIDINE 11 MG/HR: 0.5 EMULSION INTRAVENOUS at 12:05

## 2024-05-29 RX ADMIN — SODIUM CHLORIDE, PRESERVATIVE FREE 10 ML: 5 INJECTION INTRAVENOUS at 05:05

## 2024-05-29 RX ADMIN — CLEVIPIDINE 11 MG/HR: 0.5 EMULSION INTRAVENOUS at 10:05

## 2024-05-29 RX ADMIN — CLEVIPIDINE 10 MG/HR: 0.5 EMULSION INTRAVENOUS at 02:05

## 2024-05-29 RX ADMIN — FAMOTIDINE 20 MG: 10 INJECTION, SOLUTION INTRAVENOUS at 08:05

## 2024-05-29 RX ADMIN — CLEVIPIDINE 6 MG/HR: 0.5 EMULSION INTRAVENOUS at 05:05

## 2024-05-29 RX ADMIN — POTASSIUM CHLORIDE 40 MEQ: 14.9 INJECTION, SOLUTION INTRAVENOUS at 05:05

## 2024-05-29 RX ADMIN — MORPHINE SULFATE 2 MG: 4 INJECTION INTRAVENOUS at 02:05

## 2024-05-29 RX ADMIN — MUPIROCIN: 20 OINTMENT TOPICAL at 08:05

## 2024-05-29 RX ADMIN — ASPIRIN 300 MG: 300 SUPPOSITORY RECTAL at 08:05

## 2024-05-29 NOTE — PROGRESS NOTES
[facility]    Cardiology  Progress Note    Patient Name: Robert Degroot  MRN: [id number]  Admission Date: 5/24/2024  Hospital Length of Stay: 5 days  Code Status: DNR   Attending Provider: Ricky Rodas MD   Consulting Provider: GIOVANNY Amezquita  Primary Care Physician: No, Primary Doctor  Principal Problem:<principal problem not specified>    Patient information was obtained from past medical records, ER records, and primary team.     Subjective:   Chief Complaint/Reason for Consult: AF/AFL RVR    HPI: Mr. Degroot is a 63 year old male, unknown to [facility], who presented to the hospital as transfer from [place] for mechanical thrombectomy due to acute ischemic CVA. Patient presented to the outlying facility after having experienced edmund-neglect, aphasia, and right-sided agnosia with right-sided flaccid paralysis and facial droop. CT Angiogram head and neck revealed a non-opacification of the proximal to distal M1 segment of the left MCA suggesting occlusion or severe stenosis. Last known normal was approx 1700 and patient was not in window for TPA. He underwent successful mechanical thrombectomy and carotid angioplasty and was transferred to the ICU for close monitoring. He developed a cerebral shift and required left frontoparietal temporal craniectomy with removal of dura and relaxation of the brain. During this hospital stay he did develop atrial fibrillation for which [facility] is consulted.     5.29.24: NAD. Simple Face Mask. Unresponsive. Amiodarone 0.5mg/min, Cleviprex 10mg/HR. Plans to withdraw IV Drips and transition to Hospice IP Care.     PMH: Nicotine Dependence (Does not follow routinely with MD, Has not seen Physician in Many Years According to Family Report)  PSH: Cerebral Angiogram with Mechanical Thrombectomy, Left frontoparietal temporal craniectomy with removal of dura and   relaxation of brain, placement of drain  Family History: Father- Hypertension/Hyperlipidemia, Grandfather-  MI   Social History: Tobacco- Active Smoker, Alcohol- Negative, Substance Abuse- Negative     Previous Cardiac Diagnostics:   Carotid US (5.24.24):  The bilateral internal carotid artery demonstrated 50-69% stenosis.   Right vertebral arteries is patent with antegrade flow.   Left vertebral artery was not visually appreciated.     Echocardiogram (5.24.24):  Left Ventricle: The left ventricle is normal in size. Normal wall thickness. There is normal systolic function with a visually estimated ejection fraction of 65%. Grade I diastolic dysfunction.  Right Ventricle: Systolic function is normal. TAPSE is 2.43 cm.  Left Atrium: Agitated saline study of the atrial septum is negative, suggesting absence of intracardiac shunt at the atrial level.  Aortic Valve: There is mild aortic valve sclerosis.  Mitral Valve: The mitral valve is structurally normal.  Tricuspid Valve: The tricuspid valve is structurally normal.  Pulmonic Valve: The pulmonic valve is structurally normal.  IVC/SVC: Patient is ventilated, cannot use IVC diameter to estimate right atrial pressure.  Pericardium: There is no pericardial effusion.    CT Angiogram Head & Neck (5.24.24):  There is non-opacification of the proximal to distal M1 segment of the left middle cerebral artery suggesting occlusion or severe stenosis(series 15, image 27). There is associated large acute MCA territorial infarct in the left fontotemporoparietal lobe.  Hemodynamically significant stenosis proximal left internal carotid artery.    Review of patient's allergies indicates:  No Known Allergies  No current facility-administered medications on file prior to encounter.     No current outpatient medications on file prior to encounter.     Review of Systems   Unable to perform ROS: Patient unresponsive     Objective:     Vital Signs (Most Recent):  Temp: 97.9 °F (36.6 °C) (05/29/24 0800)  Pulse: 62 (05/29/24 1130)  Resp: 20 (05/29/24 1130)  BP: (!) 145/63 (05/29/24 1130)  SpO2: (!)  93 % (05/29/24 1130) Vital Signs (24h Range):  Temp:  [97.6 °F (36.4 °C)-100 °F (37.8 °C)] 97.9 °F (36.6 °C)  Pulse:  [] 62  Resp:  [11-26] 20  SpO2:  [91 %-99 %] 93 %  BP: (115-171)/(53-96) 145/63  Arterial Line BP: (132-196)/(37-73) 151/54   Weight: 83 kg (182 lb 15.7 oz)  Body mass index is 26.26 kg/m².  SpO2: (!) 93 %       Intake/Output Summary (Last 24 hours) at 5/29/2024 1159  Last data filed at 5/29/2024 1020  Gross per 24 hour   Intake 2405.41 ml   Output 2150 ml   Net 255.41 ml     Lines/Drains/Airways       Peripherally Inserted Central Catheter Line  Duration             PICC Triple Lumen 05/24/24 1844 right brachial 4 days              Drain  Duration             Male External Urinary Catheter 05/24/24 0400 Medium 5 days              Arterial Line  Duration             Arterial Line 05/25/24 1410 Right Radial 3 days              Peripheral Intravenous Line  Duration                  Peripheral IV - Single Lumen 18 G Left Antecubital -- days         Peripheral IV - Single Lumen 18 G Posterior;Right Hand -- days         Peripheral IV - Single Lumen 05/24/24 1139 Right;Posterior Forearm 5 days                  Significant Labs:  Recent Results (from the past 72 hour(s))   Sodium    Collection Time: 05/26/24  6:13 PM   Result Value Ref Range    Sodium 156 (H) 136 - 145 mmol/L   Osmolality, Serum    Collection Time: 05/26/24  6:13 PM   Result Value Ref Range    Osmolality 324 (HH) 280 - 300 mOsm/kg   Sodium    Collection Time: 05/27/24 12:16 AM   Result Value Ref Range    Sodium 158 (H) 136 - 145 mmol/L   Osmolality, Serum    Collection Time: 05/27/24 12:16 AM   Result Value Ref Range    Osmolality 323 (HH) 280 - 300 mOsm/kg   Comprehensive metabolic panel    Collection Time: 05/27/24  3:33 AM   Result Value Ref Range    Sodium 157 (H) 136 - 145 mmol/L    Potassium 3.2 (L) 3.5 - 5.1 mmol/L    Chloride 127 (HH) 98 - 107 mmol/L    CO2 21 (L) 23 - 31 mmol/L    Glucose 146 (H) 82 - 115 mg/dL    Blood Urea  Nitrogen 11.1 8.4 - 25.7 mg/dL    Creatinine 0.80 0.73 - 1.18 mg/dL    Calcium 8.4 (L) 8.8 - 10.0 mg/dL    Protein Total 5.6 (L) 5.8 - 7.6 gm/dL    Albumin 2.7 (L) 3.4 - 4.8 g/dL    Globulin 2.9 2.4 - 3.5 gm/dL    Albumin/Globulin Ratio 0.9 (L) 1.1 - 2.0 ratio    Bilirubin Total 0.4 <=1.5 mg/dL    ALP 44 40 - 150 unit/L    ALT 20 0 - 55 unit/L    AST 26 5 - 34 unit/L    eGFR >60 mL/min/1.73/m2    Anion Gap 9.0 mEq/L    BUN/Creatinine Ratio 14    CBC with Differential    Collection Time: 05/27/24  3:33 AM   Result Value Ref Range    WBC 11.46 4.50 - 11.50 x10(3)/mcL    RBC 3.41 (L) 4.70 - 6.10 x10(6)/mcL    Hgb 10.8 (L) 14.0 - 18.0 g/dL    Hct 32.1 (L) 42.0 - 52.0 %    MCV 94.1 (H) 80.0 - 94.0 fL    MCH 31.7 (H) 27.0 - 31.0 pg    MCHC 33.6 33.0 - 36.0 g/dL    RDW 14.6 11.5 - 17.0 %    Platelet 174 130 - 400 x10(3)/mcL    MPV 10.2 7.4 - 10.4 fL    Neut % 84.5 %    Lymph % 6.4 %    Mono % 8.5 %    Eos % 0.0 %    Basophil % 0.3 %    Lymph # 0.73 0.6 - 4.6 x10(3)/mcL    Neut # 9.69 (H) 2.1 - 9.2 x10(3)/mcL    Mono # 0.97 0.1 - 1.3 x10(3)/mcL    Eos # 0.00 0 - 0.9 x10(3)/mcL    Baso # 0.03 <=0.2 x10(3)/mcL    IG# 0.04 0 - 0.04 x10(3)/mcL    IG% 0.3 %    NRBC% 0.0 %   RT Blood Gas    Collection Time: 05/27/24  5:56 AM   Result Value Ref Range    Sample Type Arterial Blood     Sample site Arterial Line     Drawn by sd rrt     pH, Blood gas 7.410 7.350 - 7.450    pCO2, Blood gas 42.0 35.0 - 45.0 mmHg    pO2, Blood gas 92.0 80.0 - 100.0 mmHg    Sodium, Blood Gas 153 (H) 137 - 145 mmol/L    Potassium, Blood Gas 3.1 (L) 3.5 - 5.0 mmol/L    Calcium Level Ionized 1.23 1.12 - 1.23 mmol/L    TOC2, Blood gas 27.9 mmol/L    Base Excess, Blood gas 1.70 -2.00 - 2.00 mmol/L    sO2, Blood gas 97.2 %    HCO3, Blood gas 26.6 (H) 22.0 - 26.0 mmol/L    THb, Blood gas 10.9 (L) 12 - 16 g/dL    O2 Hb, Blood Gas 97.0 94.0 - 97.0 %    CO Hgb 0.8 0.5 - 1.5 %    Met Hgb 0.1 (L) 0.4 - 1.5 %    Allens Test N/A     Oxygen Device, Blood gas Non  Rebreather    Sodium    Collection Time: 05/27/24  5:57 AM   Result Value Ref Range    Sodium 157 (H) 136 - 145 mmol/L   Osmolality, Serum    Collection Time: 05/27/24  5:57 AM   Result Value Ref Range    Osmolality 325 (HH) 280 - 300 mOsm/kg   POCT glucose    Collection Time: 05/27/24  5:59 AM   Result Value Ref Range    POCT Glucose 135 (H) 70 - 110 mg/dL   POCT Glucose, Hand-Held Device    Collection Time: 05/27/24  6:15 AM   Result Value Ref Range    POC Glucose 135 (A) 70 - 110 MG/DL   Sodium    Collection Time: 05/27/24 11:50 AM   Result Value Ref Range    Sodium 158 (H) 136 - 145 mmol/L   Osmolality, Serum    Collection Time: 05/27/24 11:50 AM   Result Value Ref Range    Osmolality 326 (HH) 280 - 300 mOsm/kg   Sodium    Collection Time: 05/27/24  6:01 PM   Result Value Ref Range    Sodium 158 (H) 136 - 145 mmol/L   Osmolality, Serum    Collection Time: 05/27/24  6:01 PM   Result Value Ref Range    Osmolality 326 (HH) 280 - 300 mOsm/kg   Comprehensive Metabolic Panel    Collection Time: 05/27/24  8:53 PM   Result Value Ref Range    Sodium 158 (H) 136 - 145 mmol/L    Potassium 3.2 (L) 3.5 - 5.1 mmol/L    Chloride 126 (H) 98 - 107 mmol/L    CO2 25 23 - 31 mmol/L    Glucose 134 (H) 82 - 115 mg/dL    Blood Urea Nitrogen 13.9 8.4 - 25.7 mg/dL    Creatinine 0.84 0.73 - 1.18 mg/dL    Calcium 9.1 8.8 - 10.0 mg/dL    Protein Total 6.7 5.8 - 7.6 gm/dL    Albumin 2.5 (L) 3.4 - 4.8 g/dL    Globulin 4.2 (H) 2.4 - 3.5 gm/dL    Albumin/Globulin Ratio 0.6 (L) 1.1 - 2.0 ratio    Bilirubin Total 0.4 <=1.5 mg/dL    ALP 59 40 - 150 unit/L    ALT 39 0 - 55 unit/L    AST 44 (H) 5 - 34 unit/L    eGFR >60 mL/min/1.73/m2    Anion Gap 7.0 mEq/L    BUN/Creatinine Ratio 17    Magnesium    Collection Time: 05/27/24  8:53 PM   Result Value Ref Range    Magnesium Level 2.40 1.60 - 2.60 mg/dL   Phosphorus    Collection Time: 05/27/24  8:53 PM   Result Value Ref Range    Phosphorus Level 2.1 (L) 2.3 - 4.7 mg/dL   EKG 12-lead    Collection  Time: 05/27/24 11:33 PM   Result Value Ref Range    QRS Duration 82 ms    [facility] QTC Calculation 489 ms   Sodium    Collection Time: 05/27/24 11:56 PM   Result Value Ref Range    Sodium 158 (H) 136 - 145 mmol/L   Osmolality, Serum    Collection Time: 05/27/24 11:56 PM   Result Value Ref Range    Osmolality 323 (HH) 280 - 300 mOsm/kg   Comprehensive metabolic panel    Collection Time: 05/28/24  3:39 AM   Result Value Ref Range    Sodium 154 (H) 136 - 145 mmol/L    Potassium 3.3 (L) 3.5 - 5.1 mmol/L    Chloride 122 (H) 98 - 107 mmol/L    CO2 25 23 - 31 mmol/L    Glucose 168 (H) 82 - 115 mg/dL    Blood Urea Nitrogen 13.4 8.4 - 25.7 mg/dL    Creatinine 0.84 0.73 - 1.18 mg/dL    Calcium 9.1 8.8 - 10.0 mg/dL    Protein Total 6.9 5.8 - 7.6 gm/dL    Albumin 2.5 (L) 3.4 - 4.8 g/dL    Globulin 4.4 (H) 2.4 - 3.5 gm/dL    Albumin/Globulin Ratio 0.6 (L) 1.1 - 2.0 ratio    Bilirubin Total 0.4 <=1.5 mg/dL    ALP 63 40 - 150 unit/L    ALT 35 0 - 55 unit/L    AST 27 5 - 34 unit/L    eGFR >60 mL/min/1.73/m2    Anion Gap 7.0 mEq/L    BUN/Creatinine Ratio 16    CBC with Differential    Collection Time: 05/28/24  3:39 AM   Result Value Ref Range    WBC 12.97 (H) 4.50 - 11.50 x10(3)/mcL    RBC 3.70 (L) 4.70 - 6.10 x10(6)/mcL    Hgb 11.7 (L) 14.0 - 18.0 g/dL    Hct 34.9 (L) 42.0 - 52.0 %    MCV 94.3 (H) 80.0 - 94.0 fL    MCH 31.6 (H) 27.0 - 31.0 pg    MCHC 33.5 33.0 - 36.0 g/dL    RDW 14.6 11.5 - 17.0 %    Platelet 193 130 - 400 x10(3)/mcL    MPV 10.8 (H) 7.4 - 10.4 fL    Neut % 83.9 %    Lymph % 8.0 %    Mono % 7.1 %    Eos % 0.2 %    Basophil % 0.3 %    Lymph # 1.04 0.6 - 4.6 x10(3)/mcL    Neut # 10.88 (H) 2.1 - 9.2 x10(3)/mcL    Mono # 0.92 0.1 - 1.3 x10(3)/mcL    Eos # 0.02 0 - 0.9 x10(3)/mcL    Baso # 0.04 <=0.2 x10(3)/mcL    IG# 0.07 (H) 0 - 0.04 x10(3)/mcL    IG% 0.5 %    NRBC% 0.0 %   Sodium    Collection Time: 05/28/24  5:48 AM   Result Value Ref Range    Sodium 154 (H) 136 - 145 mmol/L   Osmolality, Serum    Collection Time:  05/28/24  5:48 AM   Result Value Ref Range    Osmolality 326 (HH) 280 - 300 mOsm/kg   Sodium    Collection Time: 05/28/24 11:46 AM   Result Value Ref Range    Sodium 154 (H) 136 - 145 mmol/L   Osmolality, Serum    Collection Time: 05/28/24 11:46 AM   Result Value Ref Range    Osmolality 325 (HH) 280 - 300 mOsm/kg   POCT glucose    Collection Time: 05/28/24  5:26 PM   Result Value Ref Range    POCT Glucose 128 (H) 70 - 110 mg/dL   Comprehensive metabolic panel    Collection Time: 05/29/24  3:21 AM   Result Value Ref Range    Sodium 153 (H) 136 - 145 mmol/L    Potassium 3.0 (L) 3.5 - 5.1 mmol/L    Chloride 118 (H) 98 - 107 mmol/L    CO2 26 23 - 31 mmol/L    Glucose 128 (H) 82 - 115 mg/dL    Blood Urea Nitrogen 16.7 8.4 - 25.7 mg/dL    Creatinine 0.84 0.73 - 1.18 mg/dL    Calcium 8.5 (L) 8.8 - 10.0 mg/dL    Protein Total 5.8 5.8 - 7.6 gm/dL    Albumin 2.4 (L) 3.4 - 4.8 g/dL    Globulin 3.4 2.4 - 3.5 gm/dL    Albumin/Globulin Ratio 0.7 (L) 1.1 - 2.0 ratio    Bilirubin Total 0.6 <=1.5 mg/dL    ALP 94 40 - 150 unit/L    ALT 72 (H) 0 - 55 unit/L    AST 55 (H) 5 - 34 unit/L    eGFR >60 mL/min/1.73/m2    Anion Gap 9.0 mEq/L    BUN/Creatinine Ratio 20    CBC with Differential    Collection Time: 05/29/24  3:21 AM   Result Value Ref Range    WBC 10.47 4.50 - 11.50 x10(3)/mcL    RBC 3.68 (L) 4.70 - 6.10 x10(6)/mcL    Hgb 11.7 (L) 14.0 - 18.0 g/dL    Hct 34.5 (L) 42.0 - 52.0 %    MCV 93.8 80.0 - 94.0 fL    MCH 31.8 (H) 27.0 - 31.0 pg    MCHC 33.9 33.0 - 36.0 g/dL    RDW 14.6 11.5 - 17.0 %    Platelet 192 130 - 400 x10(3)/mcL    MPV 11.7 (H) 7.4 - 10.4 fL    Neut % 79.0 %    Lymph % 10.0 %    Mono % 9.1 %    Eos % 0.6 %    Basophil % 0.5 %    Lymph # 1.05 0.6 - 4.6 x10(3)/mcL    Neut # 8.28 2.1 - 9.2 x10(3)/mcL    Mono # 0.95 0.1 - 1.3 x10(3)/mcL    Eos # 0.06 0 - 0.9 x10(3)/mcL    Baso # 0.05 <=0.2 x10(3)/mcL    IG# 0.08 (H) 0 - 0.04 x10(3)/mcL    IG% 0.8 %    NRBC% 0.0 %     Telemetry:  SR with PACS    Physical Exam  Vitals and  nursing note reviewed.   Constitutional:       General: He is not in acute distress.     Appearance: He is ill-appearing.   HENT:      Head: Normocephalic.        Mouth/Throat:      Mouth: Mucous membranes are moist.   Cardiovascular:      Rate and Rhythm: Normal rate. Rhythm irregular.      Heart sounds: Normal heart sounds.   Pulmonary:      Effort: Pulmonary effort is normal. No respiratory distress.      Breath sounds: Normal breath sounds.      Comments: Simple Face Mask O2  Abdominal:      Palpations: Abdomen is soft.   Skin:     General: Skin is warm and dry.   Neurological:      Mental Status: He is alert.      Comments: Unresponsive       Home Medications:   No current facility-administered medications on file prior to encounter.     No current outpatient medications on file prior to encounter.     Current Inpatient Medications:    Current Facility-Administered Medications:     0.9%  NaCl infusion, , Intravenous, Continuous, Radha Stiles NP, Stopped at 05/29/24 0551    acetaminophen suppository 650 mg, 650 mg, Rectal, Q4H PRN, Robel Wagoner PA, 650 mg at 05/27/24 1124    acetaminophen tablet 650 mg, 650 mg, Oral, Q4H PRN, Day, PANTERA Huston    amiodarone 360 mg/200 mL (1.8 mg/mL) infusion, 1 mg/min, Intravenous, Continuous, Susanna Strauss MD, Last Rate: 16.7 mL/hr at 05/29/24 0922, 0.5 mg/min at 05/29/24 0922    aspirin suppository 300 mg, 300 mg, Rectal, Daily, Radha Stiles NP, 300 mg at 05/29/24 0828    atorvastatin tablet 80 mg, 80 mg, Oral, Daily, Susanna Strauss MD    bisacodyL suppository 10 mg, 10 mg, Rectal, Daily PRN, Radha Stiles NP    clevidipine (CLEVIPREX) 25 mg/50 mL infusion, 0-32 mg/hr, Intravenous, Continuous, Ricky Rodas MD, Last Rate: 22 mL/hr at 05/29/24 1035, 11 mg/hr at 05/29/24 1035    famotidine (PF) injection 20 mg, 20 mg, Intravenous, Q12H, Robel Wgaoner PA, 20 mg at 05/29/24 0828    hydrALAZINE injection 10 mg, 10 mg, Intravenous, Q4H PRN,  Martin Erickson DO    HYDROcodone-acetaminophen 5-325 mg per tablet 1 tablet, 1 tablet, Oral, Q4H PRN, Robel Wagoner PA    labetaloL (NORMODYNE,TRANDATE) 500 mg in 100 mL infusion (conc: 5 mg/mL), 0-3 mg/min, Intravenous, Continuous, Dinh Pan MD, Stopped at 05/26/24 1157    labetaloL injection 10 mg, 10 mg, Intravenous, Q4H PRN, Martin Erickson DO, 10 mg at 05/29/24 0410    levETIRAcetam (Keppra) 500 mg in dextrose 5 % in water (D5W) 100 mL IVPB (MB+), 500 mg, Intravenous, Q12H, Gerald Quesada MD, Stopped at 05/29/24 0906    metoprolol tartrate (LOPRESSOR) tablet 25 mg, 25 mg, Per OG tube, BID, Demarcus Ortiz, FNP    morphine injection 2 mg, 2 mg, Intravenous, Q4H PRN, Robel Wagoner PA, 2 mg at 05/28/24 2013    mupirocin 2 % ointment, , Nasal, BID, Gerald Quesada MD, Given at 05/29/24 0841    ondansetron disintegrating tablet 8 mg, 8 mg, Oral, Q8H PRN, Gerald Quesada MD    ondansetron injection 4 mg, 4 mg, Intravenous, Q6H PRN, Robel Wagoner PA    prochlorperazine injection Soln 5 mg, 5 mg, Intravenous, Q6H PRN, Robel Wagoner PA    sodium chloride 0.9% flush 10 mL, 10 mL, Intravenous, PRN, Gerald Quesada MD    Flushing PICC/Midline Protocol, , , Until Discontinued **AND** sodium chloride 0.9% flush 10 mL, 10 mL, Intravenous, Q6H, 10 mL at 05/29/24 0551 **AND** sodium chloride 0.9% flush 10 mL, 10 mL, Intravenous, PRN, Gerald Quesada MD  VTE Risk Mitigation (From admission, onward)           Ordered     Reason for No Pharmacological VTE Prophylaxis  Once        Question:  Reasons:  Answer:  Risk of Bleeding    05/24/24 1140     IP VTE HIGH RISK PATIENT  Once         05/24/24 1140     Place sequential compression device  Until discontinued         05/24/24 1140                  Assessment:   Atrial Flutter with RVR (New Onset) - Now SR with PACS (On Amiodarone Infusion)    - CHADsVASc - 4 Points - 4.8% Stroke Risk per Year     - EF 65%    - TSH Normal   Acute Ischemic CVA with Left MCA Occlusion  Status Post Emergent Mechanical Thrombectomy and Carotid Angioplasty  Cerebral Edema with Impending Herniation    - Status Post Decompressive Craniectomy  Hypertension    - On Cleviprex Infusion as per Neuro Guidance   Hyperlipidemia    - Total Cholesterol: 270 LDL: 156, Triglycerides: 393    - Now on Statin   Acute Hypoxemic Respiratory Failure in Setting of Large Ischemic CVA with Cerebral Edema (Requiring Craniectomy) - Intubation/Mechanical Ventilation - Now in Simple Face Mask O2  CVD    - The bilateral internal carotid artery demonstrated 50-69% stenosis (Carotid US)  Oropharyngeal Dysphagia Secondary to Neurological Dysfunction  Anemia  Electrolyte Derangements- Hypokalemia/Hypernatremia  Nicotine Dependence/Active Smoker  DNR Status     Plan:   D/C Drips on Transition to Hospice Care  Family decision to Transition to IP Hospice Care without Placement of PEG Tube  We wish the PT and his family well and will keep them in our thoughts and prayers  We will be available as needed    Kalyan Guajardo, GIOVANNY  Cardiology  [facility]  05/29/2024     Physician addendum:  I have seen and examined this patient as a split-shared visit with the NATACHA d/t complicated medical management of above problems written in assessment and high acuity requiring physician expertise in medical decision-making. I performed the substantive portion of the history and exam. Above medical decision-making is also formulated by me.    Cardiovascular exam:  S1, S2  Lungs:  fine crackles at bases.  Extremities:  1+ edema bilaterally    Plan:  Medications as above.  Continue supportive therapy.  We will follow up.      Zane Baltazar MD  Cardiologist

## 2024-05-29 NOTE — PROGRESS NOTES
POD#4 left craniectomy for stroke  s/p mechanical thrombectomy and carotid angioplasty   No acute events overnight  Family discussions ongoing, likely hospice today    AFVSS  PERRL, tracking examiner intermittently  He is alert, nonverbal. Questionable command following in the left LE. Spontaneously moving left UE.  Withdraws on the right to deep painful stimuli  Incision c/d/I  ROSELIA site dry    Plan: OK to leave incision open to air  OK for Q2 hour neuro exams  Continue BP parameters 160/90  Keppra BID  Helmet when appropriate for OOB activities  PT/OT/ST  SCDs and lovenox for DVT prophylaxis  Staples to be removed 6/8  He should f/u with Dr. Adam in 4-6 weeks if they opt out of hospice  We will sign off. Please call with any questions.

## 2024-05-29 NOTE — PLAN OF CARE
Problem: Adult Inpatient Plan of Care  Goal: Plan of Care Review  Outcome: Progressing  Flowsheets (Taken 5/29/2024 0324)  Plan of Care Reviewed With: patient  Goal: Patient-Specific Goal (Individualized)  Outcome: Progressing  Goal: Absence of Hospital-Acquired Illness or Injury  Outcome: Progressing  Intervention: Identify and Manage Fall Risk  Flowsheets (Taken 5/29/2024 0324)  Safety Promotion/Fall Prevention:   Fall Risk signage in place   Fall Risk reviewed with patient/family   medications reviewed   lighting adjusted   pulse ox   side rails raised x 3  Intervention: Prevent Skin Injury  Flowsheets (Taken 5/29/2024 0324)  Body Position:   turned   30 degrees   heels elevated   neutral body alignment   neutral head position   upper extremity elevated  Skin Protection:   incontinence pads utilized   protective footwear used   transparent dressing maintained   silicone foam dressing in place   pulse oximeter probe site changed  Device Skin Pressure Protection:   absorbent pad utilized/changed   adhesive use limited   positioning supports utilized   mittens applied to hands   pressure points protected   tubing/devices free from skin contact  Intervention: Prevent and Manage VTE (Venous Thromboembolism) Risk  Flowsheets (Taken 5/29/2024 0324)  VTE Prevention/Management:   remove, assess skin, and reapply sequential compression device   ROM (passive) performed   intravenous hydration  Intervention: Prevent Infection  Flowsheets (Taken 5/29/2024 0324)  Infection Prevention:   environmental surveillance performed   equipment surfaces disinfected   hand hygiene promoted   personal protective equipment utilized   rest/sleep promoted   single patient room provided  Goal: Optimal Comfort and Wellbeing  Outcome: Progressing  Intervention: Monitor Pain and Promote Comfort  Flowsheets (Taken 5/29/2024 0324)  Pain Management Interventions:   pain management plan reviewed with patient/caregiver   pillow support provided    position adjusted   quiet environment facilitated   relaxation techniques promoted  Intervention: Provide Person-Centered Care  Flowsheets (Taken 5/29/2024 0324)  Trust Relationship/Rapport: care explained  Goal: Readiness for Transition of Care  Outcome: Progressing     Problem: Wound  Goal: Optimal Coping  Outcome: Progressing  Intervention: Support Patient and Family Response  Flowsheets (Taken 5/29/2024 0324)  Supportive Measures:   active listening utilized   relaxation techniques promoted  Family/Support System Care:   self-care encouraged   support provided  Goal: Optimal Functional Ability  Outcome: Progressing  Intervention: Optimize Functional Ability  Flowsheets (Taken 5/29/2024 0324)  Activity Management:   Arm raise - L1   Rolling - L1  Activity Assistance Provided: assistance, 2 people  Goal: Absence of Infection Signs and Symptoms  Outcome: Progressing  Intervention: Prevent or Manage Infection  Flowsheets (Taken 5/29/2024 0324)  Fever Reduction/Comfort Measures:   lightweight clothing   lightweight bedding  Goal: Improved Oral Intake  Outcome: Progressing  Goal: Optimal Pain Control and Function  Outcome: Progressing  Intervention: Prevent or Manage Pain  Flowsheets (Taken 5/29/2024 0324)  Sleep/Rest Enhancement:   awakenings minimized   consistent schedule promoted   natural light exposure provided   regular sleep/rest pattern promoted  Pain Management Interventions:   pain management plan reviewed with patient/caregiver   pillow support provided   position adjusted   quiet environment facilitated   relaxation techniques promoted  Goal: Skin Health and Integrity  Outcome: Progressing  Intervention: Optimize Skin Protection  Flowsheets (Taken 5/29/2024 0324)  Pressure Reduction Techniques:   frequent weight shift encouraged   heels elevated off bed   positioned off wounds   pressure points protected   weight shift assistance provided  Pressure Reduction Devices:   specialty bed utilized   foam  padding utilized   pressure-redistributing mattress utilized   positioning supports utilized   heel offloading device utilized  Skin Protection:   incontinence pads utilized   protective footwear used   transparent dressing maintained   silicone foam dressing in place   pulse oximeter probe site changed  Activity Management:   Arm raise - L1   Rolling - L1  Head of Bed (HOB) Positioning: HOB at 30 degrees  Goal: Optimal Wound Healing  Outcome: Progressing  Intervention: Promote Wound Healing  Flowsheets (Taken 5/29/2024 0324)  Sleep/Rest Enhancement:   awakenings minimized   consistent schedule promoted   natural light exposure provided   regular sleep/rest pattern promoted     Problem: Skin Injury Risk Increased  Goal: Skin Health and Integrity  Outcome: Progressing  Intervention: Optimize Skin Protection  Flowsheets (Taken 5/29/2024 0324)  Pressure Reduction Techniques:   frequent weight shift encouraged   heels elevated off bed   positioned off wounds   pressure points protected   weight shift assistance provided  Pressure Reduction Devices:   specialty bed utilized   foam padding utilized   pressure-redistributing mattress utilized   positioning supports utilized   heel offloading device utilized  Skin Protection:   incontinence pads utilized   protective footwear used   transparent dressing maintained   silicone foam dressing in place   pulse oximeter probe site changed  Activity Management:   Arm raise - L1   Rolling - L1  Head of Bed (HOB) Positioning: HOB at 30 degrees     Problem: Stroke, Ischemic (Includes Transient Ischemic Attack)  Goal: Optimal Coping  Outcome: Progressing  Intervention: Support Psychosocial Response to Stroke  Flowsheets (Taken 5/29/2024 0324)  Supportive Measures:   active listening utilized   relaxation techniques promoted  Family/Support System Care:   self-care encouraged   support provided  Goal: Effective Bowel Elimination  Outcome: Progressing  Goal: Optimal Cerebral Tissue  Perfusion  Outcome: Progressing  Intervention: Protect and Optimize Cerebral Perfusion  Flowsheets (Taken 5/29/2024 0324)  Sensory Stimulation Regulation:   auditory stimulation minimized   lighting decreased   quiet environment promoted   visual stimulation minimized   tactile stimulation minimized  Cerebral Perfusion Promotion:   blood pressure monitored   normothermia promoted  Goal: Optimal Cognitive Function  Outcome: Progressing  Intervention: Optimize Cognitive Function  Flowsheets (Taken 5/29/2024 0324)  Reorientation Measures: reorientation provided  Sensory Stimulation Regulation:   auditory stimulation minimized   lighting decreased   quiet environment promoted   visual stimulation minimized   tactile stimulation minimized  Goal: Improved Communication Skills  Outcome: Progressing  Intervention: Optimize Communication Skills  Flowsheets (Taken 5/29/2024 0324)  Communication Enhancement Strategies:   nonverbal strategies used   verbal and visual cues paired   extra time allowed for response   repetition utilized  Goal: Optimal Functional Ability  Outcome: Progressing  Intervention: Optimize Functional Ability  Flowsheets (Taken 5/29/2024 0324)  Self-Care Promotion: independence encouraged  Activity Management:   Arm raise - L1   Rolling - L1  Goal: Optimal Nutrition Intake  Outcome: Progressing  Goal: Effective Oxygenation and Ventilation  Outcome: Progressing  Intervention: Optimize Oxygenation and Ventilation  Flowsheets (Taken 5/29/2024 0324)  Airway/Ventilation Management:   airway patency maintained   pulmonary hygiene promoted  Head of Bed (HOB) Positioning: HOB at 30 degrees  Goal: Improved Sensorimotor Function  Outcome: Progressing  Intervention: Optimize Range of Motion, Motor Control and Function  Flowsheets (Taken 5/29/2024 0324)  Spasticity Management: positioned with supportive device  Positioning/Transfer Devices:   pillows   wedge  Range of Motion:   ROM (range of motion) performed   active  ROM (range of motion) encouraged  Intervention: Optimize Sensory and Perceptual Ability  Flowsheets (Taken 5/29/2024 0324)  Pressure Reduction Techniques:   frequent weight shift encouraged   heels elevated off bed   positioned off wounds   pressure points protected   weight shift assistance provided  Pressure Reduction Devices:   specialty bed utilized   foam padding utilized   pressure-redistributing mattress utilized   positioning supports utilized   heel offloading device utilized  Goal: Safe and Effective Swallow  Outcome: Progressing  Intervention: Promote and Optimize Fluid and Food Intake  Flowsheets (Taken 5/29/2024 0324)  Aspiration Precautions:   oral hygiene care promoted   NPO pending swallow screening/evaluation   respiratory status monitored   upright posture maintained  Feeding/Eating Techniques: oral mucosa moistened  Goal: Effective Urinary Elimination  Outcome: Progressing  Intervention: Promote Effective Bladder Elimination  Flowsheets (Taken 5/29/2024 0324)  Urinary Elimination Promotion:   positioned for ease of voiding   absorbent pad/diaper use encouraged     Problem: Fall Injury Risk  Goal: Absence of Fall and Fall-Related Injury  Outcome: Progressing  Intervention: Identify and Manage Contributors  Flowsheets (Taken 5/29/2024 0324)  Self-Care Promotion: independence encouraged  Medication Review/Management: medications reviewed  Intervention: Promote Injury-Free Environment  Flowsheets (Taken 5/29/2024 0324)  Safety Promotion/Fall Prevention:   Fall Risk signage in place   Fall Risk reviewed with patient/family   medications reviewed   lighting adjusted   pulse ox   side rails raised x 3     Problem: Infection  Goal: Absence of Infection Signs and Symptoms  Outcome: Progressing  Intervention: Prevent or Manage Infection  Flowsheets (Taken 5/29/2024 0324)  Fever Reduction/Comfort Measures:   lightweight clothing   lightweight bedding     Problem: Coping Ineffective  Goal: Effective  Coping  Outcome: Progressing  Intervention: Support and Enhance Coping Strategies  Flowsheets (Taken 5/29/2024 4222)  Supportive Measures:   active listening utilized   relaxation techniques promoted  Family/Support System Care:   self-care encouraged   support provided  Environmental Support: calm environment promoted

## 2024-05-29 NOTE — NURSING
Pt transported out of ICU via [facility] at this time. Pt packet given to EMS. Pt belongings sent home with wife

## 2024-05-29 NOTE — NURSING
Report on pt given to veda neville RN of [facility] via phone. [facility] called for transport. Family updated on plan of care

## 2024-05-29 NOTE — NURSING
Nurses Note -- 4 Eyes      5/29/2024   3:31 AM      Skin assessed during: Q Shift Change      [x] No Altered Skin Integrity Present    [x]Prevention Measures Documented      [] Yes- Altered Skin Integrity Present or Discovered   [] LDA Added if Not in Epic (Describe Wound)   [] New Altered Skin Integrity was Present on Admit and Documented in LDA   [] Wound Image Taken    Wound Care Consulted? No    Attending Nurse:  Gema Aguiar RN/Staff Member:  MANFRED Parks

## 2024-05-29 NOTE — PT/OT/SLP PROGRESS
Family opting hospice care.  Skilled SLP services no longer warranted, please reconsult as needed.

## 2024-05-29 NOTE — PLAN OF CARE
05/29/24 1330   Final Note   Assessment Type Final Discharge Note   Anticipated Discharge Disposition [facility]   Post-Acute Status   Post-Acute Authorization Hospice   Hospice Status Set-up Complete/Auth obtained   Discharge Delays None known at this time     Patient discharged to [facility] via [facility] transport. Gave nurse brown folder with d/c order and DNR order and MAR. Sent d/c order and AVS via [facility].

## 2024-05-30 NOTE — DISCHARGE SUMMARY
Discharge Summary:    Admit Date: 5/24/24  Discharge Date: 5/29/24    Admit Dx: L MCA occlusion    Final Dx: L MCA occlusion, Cerebral edema, s/p  decompressive Craniectomy, carotid stenosis s/p L ICA angioplasty    Hospital course: Mr. Degroot is a 64 y/o male with unknown past medical history who presented to [facility] as a transfer from [place] for mechanical thrombectomy secondary to acute CVA. Per chart review it appears patient attempted to take a nap earlier in the day after which he woke up experiencing hemineglect, aphasia, and right-sided agnosia with right-sided flaccid paralysis and facial droop. CTA head and neck was performed upon arrival to ED showing a non-opacification of the proximal to distal M1 segment of the left MCA suggesting occlusion or severe stenosis. Last known normal was approx 1700 and patient was not in window for TPA. He arrives to the ICU s/p successful mechanical thrombectomy and carotid angioplasty. Further history is limited secondary to intubation and sedation.     Pt then developed worsening of neuro exam and was brought to OR for Left frontoparietal temporal craniectomy with removal of dura and   relaxation of brain, placement of drain.    He self extubated the following day. Resp status remained relatively stable but neuro status was poor. Family later opted for inpatient hospice. Pt was transferred to [facility] on 5/29/24    Dc Meds/Activity/Diet/Etc are NA as transferred to inpatient hospice

## 2024-06-18 LAB
BACTERIA TISS AEROBE CULT: ABNORMAL
MAYO GENERIC ORDERABLE RESULT: ABNORMAL

## (undated) DEVICE — Device

## (undated) DEVICE — RESERVOIR JACKSON-PRATT 100CC

## (undated) DEVICE — DRAPE CRANIOTOMY T SURG STRL

## (undated) DEVICE — GOWN X-LG STERILE BACK

## (undated) DEVICE — PERFORATOR SURG CRAN 14X11MM

## (undated) DEVICE — SUT VICRYL 4-0 18 P-3

## (undated) DEVICE — GLOVE PROTEXIS BLUE LATEX 8

## (undated) DEVICE — POSITIONER HEEL FOAM CONVOLTD

## (undated) DEVICE — SUT 4/0 18IN NUROLON BLK B

## (undated) DEVICE — TUBING IRR BIPOLAR CORD 12FT

## (undated) DEVICE — CULTSWAB+ AMIES W/O CHARC SNG

## (undated) DEVICE — KIT SURGIFLO HEMOSTATIC MATRIX

## (undated) DEVICE — SWAB CULTURETTE SINGLE

## (undated) DEVICE — ADHESIVE MASTISOL VIAL 48/BX

## (undated) DEVICE — SUT BONE WAX 2.5 GRMS 12/BX

## (undated) DEVICE — KIT BIOSEAL BONE FLAP STRL

## (undated) DEVICE — BLADE EZ CLEAN 2 1/2

## (undated) DEVICE — MARKER WRITESITE SKIN CHLRAPRP

## (undated) DEVICE — SOL NACL IRR 1000ML BTL

## (undated) DEVICE — SHEET AVIENE MICFIB 1.4X1.4IN

## (undated) DEVICE — DRESSING TELFA N ADH 3X8

## (undated) DEVICE — SEALANT ADHERUS AUTOSPRY DURAL

## (undated) DEVICE — BOWL STERILE LARGE 32OZ

## (undated) DEVICE — POSITIONER HEAD ADULT

## (undated) DEVICE — ROUTER TAPERED 2.3MM

## (undated) DEVICE — SPONGE LAP 18X18 PREWASHED

## (undated) DEVICE — STRIP MEDI WND CLSR 1/2X4IN

## (undated) DEVICE — DRAPE SURG W/TWL 17 5/8X23

## (undated) DEVICE — DISH PETRI MED 3.5IN

## (undated) DEVICE — DRAIN ROUND SUCTION 10FR

## (undated) DEVICE — GLOVE PROTEXIS LTX MICRO  7.5

## (undated) DEVICE — TUBE SUCTION MEDI-VAC STERILE

## (undated) DEVICE — COVER HD BACK TABLE 6FT

## (undated) DEVICE — ELECTRODE PATIENT RETURN DISP

## (undated) DEVICE — INSTRUMENT FRAZIER 10FR W/VENT

## (undated) DEVICE — TRAY CATH FOL SIL URIMTR 16FR

## (undated) DEVICE — HEMOSTAT SURGICEL 2X14IN

## (undated) DEVICE — KIT SURGICAL TURNOVER

## (undated) DEVICE — SPONGE GAUZE 16PLY 4X4

## (undated) DEVICE — SOL .9NACL PF 100 ML

## (undated) DEVICE — SUT VICRYL PLUS 3-0 X-1 18IN

## (undated) DEVICE — STAPLER SKIN PROXIMATE WIDE

## (undated) DEVICE — BAND RUBBER STERILE 1/4X3.5IN

## (undated) DEVICE — CLIPS RANEY SCALP FFS/ASSY

## (undated) DEVICE — NDL HYPO 22GX1 1/2 SYR 10ML LL

## (undated) DEVICE — TRAY SKIN SCRUB WET PREMIUM

## (undated) DEVICE — ROUTER STRAIGHT RED 1.1MM